# Patient Record
Sex: FEMALE | Race: WHITE | NOT HISPANIC OR LATINO | Employment: OTHER | URBAN - METROPOLITAN AREA
[De-identification: names, ages, dates, MRNs, and addresses within clinical notes are randomized per-mention and may not be internally consistent; named-entity substitution may affect disease eponyms.]

---

## 2017-03-19 ENCOUNTER — APPOINTMENT (OUTPATIENT)
Dept: LAB | Facility: CLINIC | Age: 67
End: 2017-03-19
Attending: EMERGENCY MEDICINE
Payer: MEDICARE

## 2017-03-19 ENCOUNTER — OFFICE VISIT (OUTPATIENT)
Dept: URGENT CARE | Facility: CLINIC | Age: 67
End: 2017-03-19
Payer: MEDICARE

## 2017-03-19 DIAGNOSIS — R35.0 FREQUENCY OF MICTURITION: ICD-10-CM

## 2017-03-19 PROCEDURE — 99204 OFFICE O/P NEW MOD 45 MIN: CPT

## 2017-03-19 PROCEDURE — 81002 URINALYSIS NONAUTO W/O SCOPE: CPT

## 2017-03-19 PROCEDURE — G0463 HOSPITAL OUTPT CLINIC VISIT: HCPCS

## 2017-03-19 PROCEDURE — 87186 SC STD MICRODIL/AGAR DIL: CPT

## 2017-03-19 PROCEDURE — 87086 URINE CULTURE/COLONY COUNT: CPT

## 2017-03-19 PROCEDURE — 87077 CULTURE AEROBIC IDENTIFY: CPT

## 2017-03-22 LAB — BACTERIA UR CULT: NORMAL

## 2017-03-28 ENCOUNTER — APPOINTMENT (OUTPATIENT)
Dept: LAB | Facility: CLINIC | Age: 67
End: 2017-03-28
Payer: MEDICARE

## 2017-03-28 ENCOUNTER — TRANSCRIBE ORDERS (OUTPATIENT)
Dept: URGENT CARE | Facility: CLINIC | Age: 67
End: 2017-03-28

## 2017-03-28 DIAGNOSIS — N39.0 URINARY TRACT INFECTION, SITE NOT SPECIFIED: Primary | ICD-10-CM

## 2017-03-28 DIAGNOSIS — N39.0 URINARY TRACT INFECTION, SITE NOT SPECIFIED: ICD-10-CM

## 2017-03-28 PROCEDURE — 87086 URINE CULTURE/COLONY COUNT: CPT

## 2017-03-29 LAB — BACTERIA UR CULT: NORMAL

## 2017-04-25 ENCOUNTER — APPOINTMENT (OUTPATIENT)
Dept: LAB | Facility: CLINIC | Age: 67
End: 2017-04-25
Payer: MEDICARE

## 2017-04-25 ENCOUNTER — TRANSCRIBE ORDERS (OUTPATIENT)
Dept: URGENT CARE | Facility: CLINIC | Age: 67
End: 2017-04-25

## 2017-04-25 DIAGNOSIS — N39.0 URINARY TRACT INFECTION, SITE NOT SPECIFIED: ICD-10-CM

## 2017-04-25 DIAGNOSIS — N39.0 URINARY TRACT INFECTION, SITE NOT SPECIFIED: Primary | ICD-10-CM

## 2017-04-25 LAB
CREAT UR-MCNC: 53.2 MG/DL
MICROALBUMIN UR-MCNC: 24.3 MG/L (ref 0–20)
MICROALBUMIN/CREAT 24H UR: 46 MG/G CREATININE (ref 0–30)

## 2017-04-25 PROCEDURE — 82570 ASSAY OF URINE CREATININE: CPT

## 2017-04-25 PROCEDURE — 87086 URINE CULTURE/COLONY COUNT: CPT

## 2017-04-25 PROCEDURE — 87186 SC STD MICRODIL/AGAR DIL: CPT

## 2017-04-25 PROCEDURE — 82043 UR ALBUMIN QUANTITATIVE: CPT

## 2017-04-25 PROCEDURE — 87077 CULTURE AEROBIC IDENTIFY: CPT

## 2017-04-27 LAB — BACTERIA UR CULT: NORMAL

## 2017-07-13 ENCOUNTER — TRANSCRIBE ORDERS (OUTPATIENT)
Dept: URGENT CARE | Facility: CLINIC | Age: 67
End: 2017-07-13

## 2017-07-13 ENCOUNTER — APPOINTMENT (OUTPATIENT)
Dept: LAB | Facility: CLINIC | Age: 67
End: 2017-07-13
Payer: MEDICARE

## 2017-07-13 DIAGNOSIS — E78.5 HYPERLIPIDEMIA, UNSPECIFIED HYPERLIPIDEMIA TYPE: ICD-10-CM

## 2017-07-13 DIAGNOSIS — R60.9 EDEMA, UNSPECIFIED TYPE: ICD-10-CM

## 2017-07-13 DIAGNOSIS — Z79.899 ENCOUNTER FOR LONG-TERM (CURRENT) USE OF OTHER MEDICATIONS: ICD-10-CM

## 2017-07-13 DIAGNOSIS — E55.9 VITAMIN D DEFICIENCY: ICD-10-CM

## 2017-07-13 DIAGNOSIS — E03.9 HYPOTHYROIDISM, UNSPECIFIED TYPE: ICD-10-CM

## 2017-07-13 DIAGNOSIS — R73.9 HYPERGLYCEMIA: ICD-10-CM

## 2017-07-13 DIAGNOSIS — I10 ESSENTIAL HYPERTENSION: Primary | ICD-10-CM

## 2017-07-13 LAB
25(OH)D3 SERPL-MCNC: 33.7 NG/ML (ref 30–100)
ALBUMIN SERPL BCP-MCNC: 4 G/DL (ref 3.5–5)
ALP SERPL-CCNC: 69 U/L (ref 46–116)
ALT SERPL W P-5'-P-CCNC: 28 U/L (ref 12–78)
ANION GAP SERPL CALCULATED.3IONS-SCNC: 6 MMOL/L (ref 4–13)
AST SERPL W P-5'-P-CCNC: 20 U/L (ref 5–45)
BASOPHILS # BLD AUTO: 0.02 THOUSANDS/ΜL (ref 0–0.1)
BASOPHILS NFR BLD AUTO: 1 % (ref 0–1)
BILIRUB SERPL-MCNC: 0.46 MG/DL (ref 0.2–1)
BUN SERPL-MCNC: 20 MG/DL (ref 5–25)
CALCIUM SERPL-MCNC: 9.2 MG/DL (ref 8.3–10.1)
CHLORIDE SERPL-SCNC: 107 MMOL/L (ref 100–108)
CHOLEST SERPL-MCNC: 160 MG/DL (ref 50–200)
CO2 SERPL-SCNC: 28 MMOL/L (ref 21–32)
CREAT SERPL-MCNC: 0.85 MG/DL (ref 0.6–1.3)
EOSINOPHIL # BLD AUTO: 0.19 THOUSAND/ΜL (ref 0–0.61)
EOSINOPHIL NFR BLD AUTO: 4 % (ref 0–6)
ERYTHROCYTE [DISTWIDTH] IN BLOOD BY AUTOMATED COUNT: 12.7 % (ref 11.6–15.1)
EST. AVERAGE GLUCOSE BLD GHB EST-MCNC: 123 MG/DL
GFR SERPL CREATININE-BSD FRML MDRD: >60 ML/MIN/1.73SQ M
GLUCOSE P FAST SERPL-MCNC: 106 MG/DL (ref 65–99)
HBA1C MFR BLD: 5.9 % (ref 4.2–6.3)
HCT VFR BLD AUTO: 39.4 % (ref 34.8–46.1)
HDLC SERPL-MCNC: 88 MG/DL (ref 40–60)
HGB BLD-MCNC: 13.5 G/DL (ref 11.5–15.4)
LDLC SERPL CALC-MCNC: 60 MG/DL (ref 0–100)
LYMPHOCYTES # BLD AUTO: 1.67 THOUSANDS/ΜL (ref 0.6–4.47)
LYMPHOCYTES NFR BLD AUTO: 38 % (ref 14–44)
MCH RBC QN AUTO: 30.9 PG (ref 26.8–34.3)
MCHC RBC AUTO-ENTMCNC: 34.3 G/DL (ref 31.4–37.4)
MCV RBC AUTO: 90 FL (ref 82–98)
MONOCYTES # BLD AUTO: 0.48 THOUSAND/ΜL (ref 0.17–1.22)
MONOCYTES NFR BLD AUTO: 11 % (ref 4–12)
NEUTROPHILS # BLD AUTO: 2.03 THOUSANDS/ΜL (ref 1.85–7.62)
NEUTS SEG NFR BLD AUTO: 46 % (ref 43–75)
NRBC BLD AUTO-RTO: 0 /100 WBCS
NT-PROBNP SERPL-MCNC: 58 PG/ML
PLATELET # BLD AUTO: 210 THOUSANDS/UL (ref 149–390)
PMV BLD AUTO: 11.6 FL (ref 8.9–12.7)
POTASSIUM SERPL-SCNC: 4.1 MMOL/L (ref 3.5–5.3)
PROT SERPL-MCNC: 7.4 G/DL (ref 6.4–8.2)
RBC # BLD AUTO: 4.37 MILLION/UL (ref 3.81–5.12)
SODIUM SERPL-SCNC: 141 MMOL/L (ref 136–145)
T4 FREE SERPL-MCNC: 1.26 NG/DL (ref 0.76–1.46)
TRIGL SERPL-MCNC: 61 MG/DL
TSH SERPL DL<=0.05 MIU/L-ACNC: 2.24 UIU/ML (ref 0.36–3.74)
WBC # BLD AUTO: 4.41 THOUSAND/UL (ref 4.31–10.16)

## 2017-07-13 PROCEDURE — 83880 ASSAY OF NATRIURETIC PEPTIDE: CPT

## 2017-07-13 PROCEDURE — 83036 HEMOGLOBIN GLYCOSYLATED A1C: CPT

## 2017-07-13 PROCEDURE — 82306 VITAMIN D 25 HYDROXY: CPT

## 2017-07-13 PROCEDURE — 85025 COMPLETE CBC W/AUTO DIFF WBC: CPT

## 2017-07-13 PROCEDURE — 84443 ASSAY THYROID STIM HORMONE: CPT

## 2017-07-13 PROCEDURE — 36415 COLL VENOUS BLD VENIPUNCTURE: CPT

## 2017-07-13 PROCEDURE — 80061 LIPID PANEL: CPT

## 2017-07-13 PROCEDURE — 80053 COMPREHEN METABOLIC PANEL: CPT

## 2017-07-13 PROCEDURE — 84439 ASSAY OF FREE THYROXINE: CPT

## 2017-11-03 ENCOUNTER — APPOINTMENT (OUTPATIENT)
Dept: LAB | Facility: CLINIC | Age: 67
End: 2017-11-03
Payer: MEDICARE

## 2017-11-03 ENCOUNTER — TRANSCRIBE ORDERS (OUTPATIENT)
Dept: URGENT CARE | Facility: CLINIC | Age: 67
End: 2017-11-03

## 2017-11-03 DIAGNOSIS — I10 HYPERTENSION, UNSPECIFIED TYPE: Primary | ICD-10-CM

## 2017-11-03 DIAGNOSIS — E78.5 HYPERLIPIDEMIA, UNSPECIFIED HYPERLIPIDEMIA TYPE: Primary | ICD-10-CM

## 2017-11-03 DIAGNOSIS — E78.5 HYPERLIPIDEMIA, UNSPECIFIED HYPERLIPIDEMIA TYPE: ICD-10-CM

## 2017-11-03 DIAGNOSIS — R73.03 PRE-DIABETES: ICD-10-CM

## 2017-11-03 DIAGNOSIS — E03.9 HYPOTHYROIDISM, UNSPECIFIED TYPE: ICD-10-CM

## 2017-11-03 DIAGNOSIS — I10 HYPERTENSION, UNSPECIFIED TYPE: ICD-10-CM

## 2017-11-03 LAB
ALBUMIN SERPL BCP-MCNC: 3.7 G/DL (ref 3.5–5)
ALP SERPL-CCNC: 62 U/L (ref 46–116)
ALT SERPL W P-5'-P-CCNC: 29 U/L (ref 12–78)
ANION GAP SERPL CALCULATED.3IONS-SCNC: 5 MMOL/L (ref 4–13)
AST SERPL W P-5'-P-CCNC: 20 U/L (ref 5–45)
BASOPHILS # BLD AUTO: 0.01 THOUSANDS/ΜL (ref 0–0.1)
BASOPHILS NFR BLD AUTO: 0 % (ref 0–1)
BILIRUB SERPL-MCNC: 0.51 MG/DL (ref 0.2–1)
BUN SERPL-MCNC: 22 MG/DL (ref 5–25)
CALCIUM SERPL-MCNC: 9 MG/DL (ref 8.3–10.1)
CHLORIDE SERPL-SCNC: 106 MMOL/L (ref 100–108)
CHOLEST SERPL-MCNC: 152 MG/DL (ref 50–200)
CO2 SERPL-SCNC: 27 MMOL/L (ref 21–32)
CREAT SERPL-MCNC: 0.87 MG/DL (ref 0.6–1.3)
CREAT UR-MCNC: 117 MG/DL
EOSINOPHIL # BLD AUTO: 0.1 THOUSAND/ΜL (ref 0–0.61)
EOSINOPHIL NFR BLD AUTO: 2 % (ref 0–6)
ERYTHROCYTE [DISTWIDTH] IN BLOOD BY AUTOMATED COUNT: 12.2 % (ref 11.6–15.1)
EST. AVERAGE GLUCOSE BLD GHB EST-MCNC: 123 MG/DL
GFR SERPL CREATININE-BSD FRML MDRD: 69 ML/MIN/1.73SQ M
GLUCOSE P FAST SERPL-MCNC: 113 MG/DL (ref 65–99)
HBA1C MFR BLD: 5.9 % (ref 4.2–6.3)
HCT VFR BLD AUTO: 41 % (ref 34.8–46.1)
HDLC SERPL-MCNC: 98 MG/DL (ref 40–60)
HGB BLD-MCNC: 14 G/DL (ref 11.5–15.4)
LDLC SERPL CALC-MCNC: 41 MG/DL (ref 0–100)
LYMPHOCYTES # BLD AUTO: 1.54 THOUSANDS/ΜL (ref 0.6–4.47)
LYMPHOCYTES NFR BLD AUTO: 35 % (ref 14–44)
MAGNESIUM SERPL-MCNC: 2.3 MG/DL (ref 1.6–2.6)
MCH RBC QN AUTO: 31.4 PG (ref 26.8–34.3)
MCHC RBC AUTO-ENTMCNC: 34.1 G/DL (ref 31.4–37.4)
MCV RBC AUTO: 92 FL (ref 82–98)
MICROALBUMIN UR-MCNC: 6.1 MG/L (ref 0–20)
MICROALBUMIN/CREAT 24H UR: 5 MG/G CREATININE (ref 0–30)
MONOCYTES # BLD AUTO: 0.55 THOUSAND/ΜL (ref 0.17–1.22)
MONOCYTES NFR BLD AUTO: 13 % (ref 4–12)
NEUTROPHILS # BLD AUTO: 2.17 THOUSANDS/ΜL (ref 1.85–7.62)
NEUTS SEG NFR BLD AUTO: 50 % (ref 43–75)
NRBC BLD AUTO-RTO: 0 /100 WBCS
PHOSPHATE SERPL-MCNC: 3.4 MG/DL (ref 2.3–4.1)
PLATELET # BLD AUTO: 216 THOUSANDS/UL (ref 149–390)
PMV BLD AUTO: 11.5 FL (ref 8.9–12.7)
POTASSIUM SERPL-SCNC: 4.2 MMOL/L (ref 3.5–5.3)
PROT SERPL-MCNC: 7.3 G/DL (ref 6.4–8.2)
RBC # BLD AUTO: 4.46 MILLION/UL (ref 3.81–5.12)
SODIUM SERPL-SCNC: 138 MMOL/L (ref 136–145)
T4 FREE SERPL-MCNC: 1.25 NG/DL (ref 0.76–1.46)
TRIGL SERPL-MCNC: 67 MG/DL
TSH SERPL DL<=0.05 MIU/L-ACNC: 1.12 UIU/ML (ref 0.36–3.74)
WBC # BLD AUTO: 4.39 THOUSAND/UL (ref 4.31–10.16)

## 2017-11-03 PROCEDURE — 85025 COMPLETE CBC W/AUTO DIFF WBC: CPT

## 2017-11-03 PROCEDURE — 82570 ASSAY OF URINE CREATININE: CPT

## 2017-11-03 PROCEDURE — 83036 HEMOGLOBIN GLYCOSYLATED A1C: CPT

## 2017-11-03 PROCEDURE — 83735 ASSAY OF MAGNESIUM: CPT

## 2017-11-03 PROCEDURE — 82043 UR ALBUMIN QUANTITATIVE: CPT

## 2017-11-03 PROCEDURE — 80061 LIPID PANEL: CPT

## 2017-11-03 PROCEDURE — 36415 COLL VENOUS BLD VENIPUNCTURE: CPT

## 2017-11-03 PROCEDURE — 84439 ASSAY OF FREE THYROXINE: CPT

## 2017-11-03 PROCEDURE — 84443 ASSAY THYROID STIM HORMONE: CPT

## 2017-11-03 PROCEDURE — 80053 COMPREHEN METABOLIC PANEL: CPT

## 2017-11-03 PROCEDURE — 84100 ASSAY OF PHOSPHORUS: CPT

## 2018-01-25 ENCOUNTER — OFFICE VISIT (OUTPATIENT)
Dept: OBGYN CLINIC | Facility: CLINIC | Age: 68
End: 2018-01-25
Payer: MEDICARE

## 2018-01-25 VITALS
BODY MASS INDEX: 37.85 KG/M2 | DIASTOLIC BLOOD PRESSURE: 78 MMHG | WEIGHT: 227.2 LBS | SYSTOLIC BLOOD PRESSURE: 130 MMHG | HEIGHT: 65 IN

## 2018-01-25 DIAGNOSIS — Z01.419 ENCOUNTER FOR ANNUAL ROUTINE GYNECOLOGICAL EXAMINATION: Primary | ICD-10-CM

## 2018-01-25 DIAGNOSIS — Z12.31 ENCOUNTER FOR SCREENING MAMMOGRAM FOR BREAST CANCER: ICD-10-CM

## 2018-01-25 PROBLEM — Z87.448 HISTORY OF CYSTOCELE: Status: ACTIVE | Noted: 2017-12-28

## 2018-01-25 PROBLEM — M15.9 OSTEOARTHRITIS, MULTIPLE SITES: Status: ACTIVE | Noted: 2017-03-19

## 2018-01-25 PROBLEM — K21.9 GASTROESOPHAGEAL REFLUX DISEASE: Status: ACTIVE | Noted: 2017-03-19

## 2018-01-25 PROBLEM — E78.5 HYPERLIPIDEMIA: Status: ACTIVE | Noted: 2017-03-19

## 2018-01-25 PROBLEM — M19.90 ARTHRITIS: Status: ACTIVE | Noted: 2017-12-28

## 2018-01-25 PROBLEM — R73.03 PREDIABETES: Status: ACTIVE | Noted: 2017-03-19

## 2018-01-25 PROBLEM — E06.3 HASHIMOTO'S THYROIDITIS: Status: ACTIVE | Noted: 2017-03-19

## 2018-01-25 PROBLEM — J45.909 ASTHMA: Status: ACTIVE | Noted: 2017-03-19

## 2018-01-25 PROBLEM — H35.30 MACULAR DEGENERATION: Status: ACTIVE | Noted: 2017-03-19

## 2018-01-25 PROBLEM — I25.10 CORONARY DISEASE: Status: ACTIVE | Noted: 2017-03-19

## 2018-01-25 PROCEDURE — G0101 CA SCREEN;PELVIC/BREAST EXAM: HCPCS | Performed by: NURSE PRACTITIONER

## 2018-01-25 RX ORDER — ALBUTEROL SULFATE 90 UG/1
AEROSOL, METERED RESPIRATORY (INHALATION)
COMMUNITY
End: 2019-01-28

## 2018-01-25 RX ORDER — SULFAMETHOXAZOLE AND TRIMETHOPRIM 800; 160 MG/1; MG/1
1 TABLET ORAL 2 TIMES DAILY
COMMUNITY
Start: 2017-03-19 | End: 2018-01-25 | Stop reason: SDUPTHER

## 2018-01-25 RX ORDER — LEVOTHYROXINE SODIUM 175 UG/1
175 TABLET ORAL
COMMUNITY
Start: 2014-11-13 | End: 2022-03-04

## 2018-01-25 RX ORDER — LORATADINE 10 MG/1
TABLET ORAL
COMMUNITY
End: 2018-01-25 | Stop reason: SDUPTHER

## 2018-01-25 RX ORDER — MONTELUKAST SODIUM 10 MG/1
TABLET ORAL
COMMUNITY

## 2018-01-25 RX ORDER — CLOPIDOGREL BISULFATE 75 MG/1
TABLET ORAL
COMMUNITY
End: 2018-01-25 | Stop reason: SDUPTHER

## 2018-01-25 RX ORDER — ASPIRIN 81 MG/1
81 TABLET, CHEWABLE ORAL DAILY
COMMUNITY

## 2018-01-25 RX ORDER — SPIRONOLACTONE 25 MG/1
25 TABLET ORAL DAILY
COMMUNITY

## 2018-01-25 RX ORDER — CEPHALEXIN 500 MG/1
CAPSULE ORAL
COMMUNITY
Start: 2017-10-25 | End: 2020-07-04 | Stop reason: ALTCHOICE

## 2018-01-25 RX ORDER — SENNOSIDES 8.6 MG
650 CAPSULE ORAL
COMMUNITY
Start: 2011-01-25

## 2018-01-25 RX ORDER — BIOTIN 2500 MCG
2500 CAPSULE ORAL DAILY
COMMUNITY

## 2018-01-25 RX ORDER — GABAPENTIN 100 MG/1
100 CAPSULE ORAL 3 TIMES DAILY
COMMUNITY
Start: 2015-11-02

## 2018-01-25 RX ORDER — METOPROLOL SUCCINATE 25 MG/1
25 TABLET, EXTENDED RELEASE ORAL DAILY
COMMUNITY
Start: 2017-03-03

## 2018-01-25 RX ORDER — OMEPRAZOLE 20 MG/1
20 CAPSULE, DELAYED RELEASE ORAL 2 TIMES DAILY
COMMUNITY

## 2018-01-25 RX ORDER — ATORVASTATIN CALCIUM 80 MG/1
80 TABLET, FILM COATED ORAL DAILY
COMMUNITY
Start: 2017-03-03

## 2018-01-25 RX ORDER — BETAMETHASONE DIPROPIONATE 0.5 MG/ML
LOTION, AUGMENTED TOPICAL AS NEEDED
COMMUNITY
Start: 2015-11-04

## 2018-01-25 RX ORDER — NITROGLYCERIN 0.4 MG/1
TABLET SUBLINGUAL
COMMUNITY

## 2018-01-25 RX ORDER — PHENAZOPYRIDINE HYDROCHLORIDE 200 MG/1
1 TABLET, FILM COATED ORAL 3 TIMES DAILY PRN
COMMUNITY
Start: 2017-03-19 | End: 2018-01-25 | Stop reason: SDUPTHER

## 2018-01-25 RX ORDER — LEVOTHYROXINE SODIUM 0.15 MG/1
150 TABLET ORAL
COMMUNITY
Start: 2008-06-09 | End: 2020-07-04 | Stop reason: SDUPTHER

## 2018-01-25 NOTE — PATIENT INSTRUCTIONS
Continue with healthy diet and regular exercise (3-5 times per week)  Adequate calcium 1200 mg daily with 800 units of vitamin D (both dietary and supplement combined)  Split into a m  and p m  dosing (600 mg a m, 600 mg p m )  Weight bearing exercise      Return visit one year for annual exam

## 2018-01-25 NOTE — PROGRESS NOTES
Assessment/Plan:  Normal well-woman exam   Pap not indicated due to benign hysterectomy  Mammogram order provided  Up to date on colonoscopy and DEXA  RV one year  No problem-specific Assessment & Plan notes found for this encounter  Diagnoses and all orders for this visit:    Encounter for annual routine gynecological examination    Encounter for screening mammogram for breast cancer  -     Mammo screening bilateral w cad; Future    Other orders  -     acetaminophen (TYLENOL) 650 mg CR tablet; Take 1,300 mg by mouth  -     Discontinue: Acetaminophen 500 MG; Take by mouth  -     fluticasone-salmeterol (ADVAIR DISKUS) 100-50 mcg/dose; Inhale 2 puffs  -     Discontinue: fluticasone-salmeterol (ADVAIR DISKUS) 100-50 mcg/dose; Inhale  -     spironolactone (ALDACTONE) 25 mg tablet; Take by mouth  -     aspirin 81 mg chewable tablet; Chew 81 mg  -     atorvastatin (LIPITOR) 80 mg tablet; Take 80 mg by mouth  -     Biotin (BIOTIN 5000) 5 MG CAPS; Take 5 mg by mouth  -     cephalexin (KEFLEX) 500 mg capsule;   -     levothyroxine 175 mcg tablet; Take 175 mcg by mouth  -     Discontinue: loratadine (CLARITIN) 10 mg tablet; Take by mouth  -     montelukast (SINGULAIR) 10 mg tablet; Take by mouth  -     Multiple Vitamin (MULTI-VITAMIN DAILY PO); Take by mouth  -     nitroglycerin (NITROSTAT) 0 4 mg SL tablet; Place under the tongue  -     omeprazole (PriLOSEC) 20 mg delayed release capsule; Take by mouth  -     albuterol (PROVENTIL HFA) 90 mcg/act inhaler; Inhale  -     betamethasone, augmented, (DIPROLENE) 0 05 % lotion; Apply topically  -     gabapentin (NEURONTIN) 100 mg capsule; Take 100 mg by mouth  -     metoprolol succinate (TOPROL-XL) 25 mg 24 hr tablet; Take 25 mg by mouth  -     Discontinue: metoprolol tartrate (LOPRESSOR) 25 mg tablet; Take by mouth  -     Discontinue: clopidogrel (PLAVIX) 75 mg tablet;  Take by mouth  -     Discontinue: sulfamethoxazole-trimethoprim (BACTRIM DS) 800-160 mg per tablet; Take 1 tablet by mouth 2 (two) times a day  -     Discontinue: phenazopyridine (PYRIDIUM) 200 mg tablet; Take 1 tablet by mouth 3 (three) times a day as needed  -     levothyroxine 150 mcg tablet; Take 150 mcg by mouth  -     loratadine (CLARITIN REDITABS) 10 MG dissolvable tablet; Take 10 mg by mouth          Subjective:      Patient ID: Jordi Huggins is a 75 y o  female  HPI  Kera Tate is a 74 yo female who presents for her yearly gyn exam   She is known to me from [facility]  [facility] records reviewed from [facility]  History ENRIQUE/BSO  11/2017 mammogram negative, 2/4  Colonoscopy 2010  DEXA 2015 normal    She has no problems to report  The following portions of the patient's history were reviewed and updated as appropriate: allergies, current medications, past family history, past medical history, past social history, past surgical history and problem list     Review of Systems   Constitutional: Negative for chills and fever  Gastrointestinal: Negative for abdominal distention, abdominal pain, blood in stool, constipation, diarrhea, nausea and vomiting  Genitourinary: Positive for urgency  Negative for difficulty urinating, dysuria, frequency, genital sores, hematuria, menstrual problem, pelvic pain, vaginal bleeding and vaginal discharge  Objective:     Physical Exam   Constitutional: She appears well-developed and well-nourished  No distress  Neck: Neck supple  No thyromegaly present  Pulmonary/Chest: Right breast exhibits no inverted nipple, no mass, no nipple discharge, no skin change and no tenderness  Left breast exhibits no inverted nipple, no mass, no nipple discharge, no skin change and no tenderness  Breasts are symmetrical    Abdominal: Soft  Normal appearance  She exhibits no mass  There is no tenderness  There is no CVA tenderness  Genitourinary: Rectum normal  Rectal exam shows guaiac negative stool  No labial fusion  There is no rash, tenderness, lesion or injury on the right labia  There is no rash, tenderness, lesion or injury on the left labia  No erythema, tenderness or bleeding in the vagina  No foreign body in the vagina  No signs of injury around the vagina  No vaginal discharge found  Genitourinary Comments: Cervix, uterus and ovaries are surgically absent  Lymphadenopathy:     She has no cervical adenopathy  She has no axillary adenopathy  Right: No inguinal and no supraclavicular adenopathy present  Left: No inguinal and no supraclavicular adenopathy present  Neurological: She is alert  She is not disoriented  Skin: Skin is warm, dry and intact  Psychiatric: She has a normal mood and affect   Her behavior is normal

## 2018-04-10 ENCOUNTER — TRANSCRIBE ORDERS (OUTPATIENT)
Dept: LAB | Facility: CLINIC | Age: 68
End: 2018-04-10

## 2018-04-10 ENCOUNTER — APPOINTMENT (OUTPATIENT)
Dept: LAB | Facility: CLINIC | Age: 68
End: 2018-04-10
Payer: MEDICARE

## 2018-04-10 DIAGNOSIS — E78.5 HYPERLIPIDEMIA, UNSPECIFIED HYPERLIPIDEMIA TYPE: ICD-10-CM

## 2018-04-10 DIAGNOSIS — R60.9 EDEMA, UNSPECIFIED TYPE: ICD-10-CM

## 2018-04-10 DIAGNOSIS — R73.9 HYPERGLYCEMIA: ICD-10-CM

## 2018-04-10 DIAGNOSIS — Z11.59 NEED FOR HEPATITIS C SCREENING TEST: ICD-10-CM

## 2018-04-10 DIAGNOSIS — E55.9 VITAMIN D DEFICIENCY: ICD-10-CM

## 2018-04-10 DIAGNOSIS — I10 HYPERTENSION, UNSPECIFIED TYPE: Primary | ICD-10-CM

## 2018-04-10 DIAGNOSIS — E03.9 HYPOTHYROIDISM, UNSPECIFIED TYPE: ICD-10-CM

## 2018-04-10 LAB
25(OH)D3 SERPL-MCNC: 39.5 NG/ML (ref 30–100)
ALBUMIN SERPL BCP-MCNC: 4.1 G/DL (ref 3.5–5)
ALP SERPL-CCNC: 67 U/L (ref 46–116)
ALT SERPL W P-5'-P-CCNC: 30 U/L (ref 12–78)
ANION GAP SERPL CALCULATED.3IONS-SCNC: 3 MMOL/L (ref 4–13)
AST SERPL W P-5'-P-CCNC: 22 U/L (ref 5–45)
BILIRUB SERPL-MCNC: 0.52 MG/DL (ref 0.2–1)
BUN SERPL-MCNC: 15 MG/DL (ref 5–25)
CALCIUM SERPL-MCNC: 9.4 MG/DL
CHLORIDE SERPL-SCNC: 105 MMOL/L (ref 100–108)
CHOLEST SERPL-MCNC: 128 MG/DL (ref 50–200)
CO2 SERPL-SCNC: 29 MMOL/L (ref 21–32)
CREAT SERPL-MCNC: 0.82 MG/DL (ref 0.6–1.3)
EST. AVERAGE GLUCOSE BLD GHB EST-MCNC: 117 MG/DL
GFR SERPL CREATININE-BSD FRML MDRD: 74 ML/MIN/1.73SQ M
GLUCOSE P FAST SERPL-MCNC: 108 MG/DL (ref 65–99)
HBA1C MFR BLD: 5.7 % (ref 4.2–6.3)
HDLC SERPL-MCNC: 74 MG/DL (ref 40–60)
LDLC SERPL CALC-MCNC: 40 MG/DL (ref 0–100)
NONHDLC SERPL-MCNC: 54 MG/DL
NT-PROBNP SERPL-MCNC: 55 PG/ML
POTASSIUM SERPL-SCNC: 4.3 MMOL/L (ref 3.5–5.3)
PROT SERPL-MCNC: 7.5 G/DL (ref 6.4–8.2)
SODIUM SERPL-SCNC: 137 MMOL/L (ref 136–145)
T4 FREE SERPL-MCNC: 1.59 NG/DL (ref 0.76–1.46)
TRIGL SERPL-MCNC: 72 MG/DL
TSH SERPL DL<=0.05 MIU/L-ACNC: 0.28 UIU/ML (ref 0.36–3.74)

## 2018-04-10 PROCEDURE — 80061 LIPID PANEL: CPT

## 2018-04-10 PROCEDURE — 36415 COLL VENOUS BLD VENIPUNCTURE: CPT

## 2018-04-10 PROCEDURE — 83880 ASSAY OF NATRIURETIC PEPTIDE: CPT

## 2018-04-10 PROCEDURE — 82306 VITAMIN D 25 HYDROXY: CPT

## 2018-04-10 PROCEDURE — 80053 COMPREHEN METABOLIC PANEL: CPT

## 2018-04-10 PROCEDURE — 84443 ASSAY THYROID STIM HORMONE: CPT

## 2018-04-10 PROCEDURE — 83036 HEMOGLOBIN GLYCOSYLATED A1C: CPT

## 2018-04-10 PROCEDURE — 86803 HEPATITIS C AB TEST: CPT

## 2018-04-10 PROCEDURE — 84439 ASSAY OF FREE THYROXINE: CPT

## 2018-04-11 LAB — HCV AB SER QL: NORMAL

## 2018-08-27 ENCOUNTER — TRANSCRIBE ORDERS (OUTPATIENT)
Dept: ADMINISTRATIVE | Facility: HOSPITAL | Age: 68
End: 2018-08-27

## 2018-08-27 DIAGNOSIS — M81.0 OSTEOPOROSIS, UNSPECIFIED OSTEOPOROSIS TYPE, UNSPECIFIED PATHOLOGICAL FRACTURE PRESENCE: Primary | ICD-10-CM

## 2018-08-28 ENCOUNTER — APPOINTMENT (OUTPATIENT)
Dept: LAB | Facility: CLINIC | Age: 68
End: 2018-08-28
Payer: MEDICARE

## 2018-08-28 ENCOUNTER — TRANSCRIBE ORDERS (OUTPATIENT)
Dept: URGENT CARE | Facility: CLINIC | Age: 68
End: 2018-08-28

## 2018-08-28 DIAGNOSIS — E03.9 HYPOTHYROIDISM, ADULT: Primary | ICD-10-CM

## 2018-08-28 LAB
T4 FREE SERPL-MCNC: 1.32 NG/DL (ref 0.76–1.46)
TSH SERPL DL<=0.05 MIU/L-ACNC: 0.89 UIU/ML (ref 0.36–3.74)

## 2018-08-28 PROCEDURE — 84439 ASSAY OF FREE THYROXINE: CPT

## 2018-08-28 PROCEDURE — 84443 ASSAY THYROID STIM HORMONE: CPT

## 2018-08-28 PROCEDURE — 36415 COLL VENOUS BLD VENIPUNCTURE: CPT

## 2018-09-26 ENCOUNTER — HOSPITAL ENCOUNTER (OUTPATIENT)
Dept: BONE DENSITY | Facility: HOSPITAL | Age: 68
Discharge: HOME | End: 2018-09-26
Attending: INTERNAL MEDICINE
Payer: MEDICARE

## 2018-09-26 DIAGNOSIS — M81.0 OSTEOPOROSIS, UNSPECIFIED OSTEOPOROSIS TYPE, UNSPECIFIED PATHOLOGICAL FRACTURE PRESENCE: ICD-10-CM

## 2018-09-26 PROCEDURE — 77080 DXA BONE DENSITY AXIAL: CPT

## 2019-01-28 ENCOUNTER — ANNUAL EXAM (OUTPATIENT)
Dept: OBGYN CLINIC | Facility: CLINIC | Age: 69
End: 2019-01-28
Payer: MEDICARE

## 2019-01-28 VITALS
BODY MASS INDEX: 36.35 KG/M2 | SYSTOLIC BLOOD PRESSURE: 140 MMHG | HEIGHT: 65 IN | WEIGHT: 218.2 LBS | DIASTOLIC BLOOD PRESSURE: 90 MMHG | OXYGEN SATURATION: 97 % | HEART RATE: 70 BPM

## 2019-01-28 DIAGNOSIS — Z12.31 BREAST CANCER SCREENING BY MAMMOGRAM: ICD-10-CM

## 2019-01-28 DIAGNOSIS — Z01.419 ENCOUNTER FOR GYNECOLOGICAL EXAMINATION WITHOUT ABNORMAL FINDING: Primary | ICD-10-CM

## 2019-01-28 PROCEDURE — G0101 CA SCREEN;PELVIC/BREAST EXAM: HCPCS | Performed by: NURSE PRACTITIONER

## 2019-01-28 NOTE — PROGRESS NOTES
Assessment / Plan    1  Encounter for gynecological examination without abnormal finding  Normal well woman exam  Cervical cancer screening not indicated-- hx of benign hysterectomy  Up to date on colonoscopy    2  Breast cancer screening by mammogram  Order provided  - Mammo screening bilateral w cad; Future          Subjective      Shannan Vivar is a 70 y o  female who presents for her annual gynecologic exam     Hx of ENRIQUE w/ BSO, benign    /90  BMI 36 8  Asthma, hyperlipidemia, hypothyroidism, seasonal allergies, GERD  H/o DVT    Last pap- n/a  Last mammogram 2017 negative  Colonoscopy:   DEXA scan: 2018 normal    History of abnormal Pap smear: no  Family history of breast,uterine, ovarian or colon cancer: no      Menstrual History:  OB History      Para Term  AB Living    2 2            SAB TAB Ectopic Multiple Live Births                        No LMP recorded  Patient has had a hysterectomy  The following portions of the patient's history were reviewed and updated as appropriate: allergies, current medications, past family history, past medical history, past social history, past surgical history and problem list     Review of Systems      Review of Systems   Constitutional: Negative for chills and fever  Gastrointestinal: Negative for abdominal distention, abdominal pain, blood in stool, constipation, diarrhea, nausea and vomiting  Genitourinary: Negative for difficulty urinating, dysuria, frequency, genital sores, hematuria, menstrual problem, pelvic pain, urgency, vaginal bleeding and vaginal discharge       Breasts:  Negative for skin changes, dimpling, asymmetry, nipple discharge, redness, tenderness or palpable masses    Objective      /90 (BP Location: Left arm, Patient Position: Sitting, Cuff Size: Adult)   Pulse 70   Ht 5' 4 57" (1 64 m)   Wt 99 kg (218 lb 3 2 oz)   SpO2 97%   BMI 36 80 kg/m²      Physical Exam   Constitutional: She is oriented to person, place, and time  She appears well-developed and well-nourished  No distress  HENT:   Head: Normocephalic and atraumatic  Eyes: Pupils are equal, round, and reactive to light  Neck: Neck supple  No thyromegaly present  Pulmonary/Chest: Effort normal  Right breast exhibits no inverted nipple, no mass, no nipple discharge, no skin change and no tenderness  Left breast exhibits no inverted nipple, no mass, no nipple discharge, no skin change and no tenderness  Breasts are symmetrical    Abdominal: Soft  Normal appearance  She exhibits no mass  There is no tenderness  There is no CVA tenderness  Genitourinary: Rectum normal and uterus normal  Rectal exam shows guaiac negative stool  No labial fusion  There is no rash, tenderness, lesion or injury on the right labia  There is no rash, tenderness, lesion or injury on the left labia  Right adnexum displays no mass, no tenderness and no fullness  Left adnexum displays no mass, no tenderness and no fullness  No erythema, tenderness or bleeding in the vagina  No foreign body in the vagina  No signs of injury around the vagina  No vaginal discharge found  Genitourinary Comments: Cervix, uterus and ovaries are surgically absent  Lymphadenopathy:     She has no cervical adenopathy  She has no axillary adenopathy  Right: No inguinal and no supraclavicular adenopathy present  Left: No inguinal and no supraclavicular adenopathy present  Neurological: She is alert and oriented to person, place, and time  She is not disoriented  Skin: Skin is warm, dry and intact  Psychiatric: She has a normal mood and affect   Her behavior is normal  Thought content normal

## 2019-02-06 DIAGNOSIS — Z12.31 BREAST CANCER SCREENING BY MAMMOGRAM: ICD-10-CM

## 2019-03-26 ENCOUNTER — APPOINTMENT (OUTPATIENT)
Dept: RADIOLOGY | Facility: CLINIC | Age: 69
End: 2019-03-26
Payer: MEDICARE

## 2019-03-26 ENCOUNTER — TRANSCRIBE ORDERS (OUTPATIENT)
Dept: URGENT CARE | Facility: CLINIC | Age: 69
End: 2019-03-26

## 2019-03-26 DIAGNOSIS — M54.9 BACK PAIN, UNSPECIFIED BACK LOCATION, UNSPECIFIED BACK PAIN LATERALITY, UNSPECIFIED CHRONICITY: ICD-10-CM

## 2019-03-26 DIAGNOSIS — M25.551 PAIN OF BOTH HIP JOINTS: ICD-10-CM

## 2019-03-26 DIAGNOSIS — M54.9 BACK PAIN, UNSPECIFIED BACK LOCATION, UNSPECIFIED BACK PAIN LATERALITY, UNSPECIFIED CHRONICITY: Primary | ICD-10-CM

## 2019-03-26 DIAGNOSIS — M25.552 PAIN OF BOTH HIP JOINTS: ICD-10-CM

## 2019-03-26 PROCEDURE — 72110 X-RAY EXAM L-2 SPINE 4/>VWS: CPT

## 2019-03-26 PROCEDURE — 73522 X-RAY EXAM HIPS BI 3-4 VIEWS: CPT

## 2019-04-24 ENCOUNTER — TRANSCRIBE ORDERS (OUTPATIENT)
Dept: LAB | Facility: CLINIC | Age: 69
End: 2019-04-24

## 2019-04-24 ENCOUNTER — APPOINTMENT (OUTPATIENT)
Dept: LAB | Facility: CLINIC | Age: 69
End: 2019-04-24
Payer: MEDICARE

## 2019-04-24 DIAGNOSIS — Z79.899 LONG TERM USE OF DRUG: ICD-10-CM

## 2019-04-24 DIAGNOSIS — E78.5 HYPERLIPIDEMIA, UNSPECIFIED HYPERLIPIDEMIA TYPE: ICD-10-CM

## 2019-04-24 DIAGNOSIS — E55.9 VITAMIN D DEFICIENCY: ICD-10-CM

## 2019-04-24 DIAGNOSIS — I10 HYPERTENSION, UNSPECIFIED TYPE: Primary | ICD-10-CM

## 2019-04-24 DIAGNOSIS — R73.9 HYPERGLYCEMIA: ICD-10-CM

## 2019-04-24 DIAGNOSIS — R63.5 WEIGHT GAIN: ICD-10-CM

## 2019-04-24 LAB
25(OH)D3 SERPL-MCNC: 33.8 NG/ML (ref 30–100)
ALBUMIN SERPL BCP-MCNC: 3.7 G/DL (ref 3.5–5)
ALP SERPL-CCNC: 75 U/L (ref 46–116)
ALT SERPL W P-5'-P-CCNC: 33 U/L (ref 12–78)
ANION GAP SERPL CALCULATED.3IONS-SCNC: 6 MMOL/L (ref 4–13)
AST SERPL W P-5'-P-CCNC: 23 U/L (ref 5–45)
BILIRUB SERPL-MCNC: 0.47 MG/DL (ref 0.2–1)
BUN SERPL-MCNC: 20 MG/DL (ref 5–25)
CALCIUM SERPL-MCNC: 9.1 MG/DL (ref 8.3–10.1)
CHLORIDE SERPL-SCNC: 108 MMOL/L (ref 100–108)
CHOLEST SERPL-MCNC: 154 MG/DL (ref 50–200)
CO2 SERPL-SCNC: 28 MMOL/L (ref 21–32)
CREAT SERPL-MCNC: 0.81 MG/DL (ref 0.6–1.3)
ERYTHROCYTE [DISTWIDTH] IN BLOOD BY AUTOMATED COUNT: 12.2 % (ref 11.6–15.1)
EST. AVERAGE GLUCOSE BLD GHB EST-MCNC: 117 MG/DL
GFR SERPL CREATININE-BSD FRML MDRD: 74 ML/MIN/1.73SQ M
GLUCOSE P FAST SERPL-MCNC: 106 MG/DL (ref 65–99)
HBA1C MFR BLD: 5.7 % (ref 4.2–6.3)
HCT VFR BLD AUTO: 42 % (ref 34.8–46.1)
HDLC SERPL-MCNC: 83 MG/DL (ref 40–60)
HGB BLD-MCNC: 13.7 G/DL (ref 11.5–15.4)
LDLC SERPL CALC-MCNC: 59 MG/DL (ref 0–100)
MCH RBC QN AUTO: 30.7 PG (ref 26.8–34.3)
MCHC RBC AUTO-ENTMCNC: 32.6 G/DL (ref 31.4–37.4)
MCV RBC AUTO: 94 FL (ref 82–98)
NONHDLC SERPL-MCNC: 71 MG/DL
PLATELET # BLD AUTO: 178 THOUSANDS/UL (ref 149–390)
PMV BLD AUTO: 12.5 FL (ref 8.9–12.7)
POTASSIUM SERPL-SCNC: 4.6 MMOL/L (ref 3.5–5.3)
PROT SERPL-MCNC: 7.6 G/DL (ref 6.4–8.2)
RBC # BLD AUTO: 4.46 MILLION/UL (ref 3.81–5.12)
SODIUM SERPL-SCNC: 142 MMOL/L (ref 136–145)
T4 FREE SERPL-MCNC: 1.26 NG/DL (ref 0.76–1.46)
TRIGL SERPL-MCNC: 61 MG/DL
TSH SERPL DL<=0.05 MIU/L-ACNC: 1.4 UIU/ML (ref 0.36–3.74)
WBC # BLD AUTO: 3.89 THOUSAND/UL (ref 4.31–10.16)

## 2019-04-24 PROCEDURE — 80053 COMPREHEN METABOLIC PANEL: CPT

## 2019-04-24 PROCEDURE — 85027 COMPLETE CBC AUTOMATED: CPT

## 2019-04-24 PROCEDURE — 80061 LIPID PANEL: CPT

## 2019-04-24 PROCEDURE — 84443 ASSAY THYROID STIM HORMONE: CPT

## 2019-04-24 PROCEDURE — 82306 VITAMIN D 25 HYDROXY: CPT

## 2019-04-24 PROCEDURE — 83036 HEMOGLOBIN GLYCOSYLATED A1C: CPT

## 2019-04-24 PROCEDURE — 36415 COLL VENOUS BLD VENIPUNCTURE: CPT

## 2019-04-24 PROCEDURE — 84439 ASSAY OF FREE THYROXINE: CPT

## 2019-04-26 ENCOUNTER — EVALUATION (OUTPATIENT)
Dept: PHYSICAL THERAPY | Facility: CLINIC | Age: 69
End: 2019-04-26
Payer: MEDICARE

## 2019-04-26 ENCOUNTER — TRANSCRIBE ORDERS (OUTPATIENT)
Dept: PHYSICAL THERAPY | Facility: CLINIC | Age: 69
End: 2019-04-26

## 2019-04-26 DIAGNOSIS — M51.36 DEGENERATION OF LUMBAR INTERVERTEBRAL DISC: Primary | ICD-10-CM

## 2019-04-26 DIAGNOSIS — M48.061 SPINAL STENOSIS, LUMBAR REGION, WITHOUT NEUROGENIC CLAUDICATION: ICD-10-CM

## 2019-04-26 PROCEDURE — 97162 PT EVAL MOD COMPLEX 30 MIN: CPT | Performed by: PHYSICAL THERAPIST

## 2019-04-29 ENCOUNTER — OFFICE VISIT (OUTPATIENT)
Dept: PHYSICAL THERAPY | Facility: CLINIC | Age: 69
End: 2019-04-29
Payer: MEDICARE

## 2019-04-29 DIAGNOSIS — M51.36 DEGENERATION OF LUMBAR INTERVERTEBRAL DISC: Primary | ICD-10-CM

## 2019-04-29 DIAGNOSIS — M48.061 SPINAL STENOSIS, LUMBAR REGION, WITHOUT NEUROGENIC CLAUDICATION: ICD-10-CM

## 2019-04-29 PROCEDURE — 97110 THERAPEUTIC EXERCISES: CPT

## 2019-04-29 PROCEDURE — 97140 MANUAL THERAPY 1/> REGIONS: CPT

## 2019-05-01 ENCOUNTER — OFFICE VISIT (OUTPATIENT)
Dept: PHYSICAL THERAPY | Facility: CLINIC | Age: 69
End: 2019-05-01
Payer: MEDICARE

## 2019-05-01 DIAGNOSIS — M51.36 DEGENERATION OF LUMBAR INTERVERTEBRAL DISC: Primary | ICD-10-CM

## 2019-05-01 DIAGNOSIS — M48.061 SPINAL STENOSIS, LUMBAR REGION, WITHOUT NEUROGENIC CLAUDICATION: ICD-10-CM

## 2019-05-01 PROCEDURE — 97150 GROUP THERAPEUTIC PROCEDURES: CPT

## 2019-05-01 PROCEDURE — 97140 MANUAL THERAPY 1/> REGIONS: CPT

## 2019-05-03 ENCOUNTER — OFFICE VISIT (OUTPATIENT)
Dept: PHYSICAL THERAPY | Facility: CLINIC | Age: 69
End: 2019-05-03
Payer: MEDICARE

## 2019-05-03 DIAGNOSIS — M48.061 SPINAL STENOSIS, LUMBAR REGION, WITHOUT NEUROGENIC CLAUDICATION: ICD-10-CM

## 2019-05-03 DIAGNOSIS — M51.36 DEGENERATION OF LUMBAR INTERVERTEBRAL DISC: Primary | ICD-10-CM

## 2019-05-03 PROCEDURE — 97150 GROUP THERAPEUTIC PROCEDURES: CPT | Performed by: PHYSICAL THERAPIST

## 2019-05-03 PROCEDURE — 97140 MANUAL THERAPY 1/> REGIONS: CPT | Performed by: PHYSICAL THERAPIST

## 2019-05-06 ENCOUNTER — TRANSCRIBE ORDERS (OUTPATIENT)
Dept: URGENT CARE | Facility: CLINIC | Age: 69
End: 2019-05-06

## 2019-05-06 ENCOUNTER — APPOINTMENT (OUTPATIENT)
Dept: RADIOLOGY | Facility: CLINIC | Age: 69
End: 2019-05-06
Payer: MEDICARE

## 2019-05-06 ENCOUNTER — OFFICE VISIT (OUTPATIENT)
Dept: PHYSICAL THERAPY | Facility: CLINIC | Age: 69
End: 2019-05-06
Payer: MEDICARE

## 2019-05-06 DIAGNOSIS — M79.672 LEFT FOOT PAIN: ICD-10-CM

## 2019-05-06 DIAGNOSIS — M51.36 DEGENERATION OF LUMBAR INTERVERTEBRAL DISC: Primary | ICD-10-CM

## 2019-05-06 DIAGNOSIS — M79.672 LEFT FOOT PAIN: Primary | ICD-10-CM

## 2019-05-06 DIAGNOSIS — M48.061 SPINAL STENOSIS, LUMBAR REGION, WITHOUT NEUROGENIC CLAUDICATION: ICD-10-CM

## 2019-05-06 PROCEDURE — 73630 X-RAY EXAM OF FOOT: CPT

## 2019-05-06 PROCEDURE — 97150 GROUP THERAPEUTIC PROCEDURES: CPT

## 2019-05-06 PROCEDURE — 97140 MANUAL THERAPY 1/> REGIONS: CPT

## 2019-05-08 ENCOUNTER — OFFICE VISIT (OUTPATIENT)
Dept: PHYSICAL THERAPY | Facility: CLINIC | Age: 69
End: 2019-05-08
Payer: MEDICARE

## 2019-05-08 DIAGNOSIS — M48.061 SPINAL STENOSIS, LUMBAR REGION, WITHOUT NEUROGENIC CLAUDICATION: ICD-10-CM

## 2019-05-08 DIAGNOSIS — M51.36 DEGENERATION OF LUMBAR INTERVERTEBRAL DISC: Primary | ICD-10-CM

## 2019-05-08 PROCEDURE — 97110 THERAPEUTIC EXERCISES: CPT

## 2019-05-08 PROCEDURE — 97140 MANUAL THERAPY 1/> REGIONS: CPT

## 2019-05-10 ENCOUNTER — OFFICE VISIT (OUTPATIENT)
Dept: PHYSICAL THERAPY | Facility: CLINIC | Age: 69
End: 2019-05-10
Payer: MEDICARE

## 2019-05-10 DIAGNOSIS — M51.36 DEGENERATION OF LUMBAR INTERVERTEBRAL DISC: Primary | ICD-10-CM

## 2019-05-10 DIAGNOSIS — M48.061 SPINAL STENOSIS, LUMBAR REGION, WITHOUT NEUROGENIC CLAUDICATION: ICD-10-CM

## 2019-05-10 PROCEDURE — 97110 THERAPEUTIC EXERCISES: CPT | Performed by: PHYSICAL THERAPIST

## 2019-05-10 PROCEDURE — 97140 MANUAL THERAPY 1/> REGIONS: CPT | Performed by: PHYSICAL THERAPIST

## 2019-05-20 ENCOUNTER — OFFICE VISIT (OUTPATIENT)
Dept: PHYSICAL THERAPY | Facility: CLINIC | Age: 69
End: 2019-05-20
Payer: MEDICARE

## 2019-05-20 DIAGNOSIS — M48.061 SPINAL STENOSIS, LUMBAR REGION, WITHOUT NEUROGENIC CLAUDICATION: ICD-10-CM

## 2019-05-20 DIAGNOSIS — M51.36 DEGENERATION OF LUMBAR INTERVERTEBRAL DISC: Primary | ICD-10-CM

## 2019-05-20 PROCEDURE — 97110 THERAPEUTIC EXERCISES: CPT

## 2019-05-20 PROCEDURE — 97140 MANUAL THERAPY 1/> REGIONS: CPT

## 2019-05-23 ENCOUNTER — OFFICE VISIT (OUTPATIENT)
Dept: PHYSICAL THERAPY | Facility: CLINIC | Age: 69
End: 2019-05-23
Payer: MEDICARE

## 2019-05-23 DIAGNOSIS — M48.061 SPINAL STENOSIS, LUMBAR REGION, WITHOUT NEUROGENIC CLAUDICATION: ICD-10-CM

## 2019-05-23 DIAGNOSIS — M51.36 DEGENERATION OF LUMBAR INTERVERTEBRAL DISC: Primary | ICD-10-CM

## 2019-05-23 PROCEDURE — 97150 GROUP THERAPEUTIC PROCEDURES: CPT

## 2019-05-23 PROCEDURE — 97110 THERAPEUTIC EXERCISES: CPT

## 2019-05-28 ENCOUNTER — EVALUATION (OUTPATIENT)
Dept: PHYSICAL THERAPY | Facility: CLINIC | Age: 69
End: 2019-05-28
Payer: MEDICARE

## 2019-05-28 DIAGNOSIS — M51.36 DEGENERATION OF LUMBAR INTERVERTEBRAL DISC: Primary | ICD-10-CM

## 2019-05-28 DIAGNOSIS — M48.061 SPINAL STENOSIS, LUMBAR REGION, WITHOUT NEUROGENIC CLAUDICATION: ICD-10-CM

## 2019-05-28 PROCEDURE — 97110 THERAPEUTIC EXERCISES: CPT | Performed by: PHYSICAL THERAPIST

## 2019-05-28 PROCEDURE — 97164 PT RE-EVAL EST PLAN CARE: CPT | Performed by: PHYSICAL THERAPIST

## 2019-05-31 ENCOUNTER — TRANSCRIBE ORDERS (OUTPATIENT)
Dept: PHYSICAL THERAPY | Facility: CLINIC | Age: 69
End: 2019-05-31

## 2019-05-31 DIAGNOSIS — M51.36 DEGENERATION OF LUMBAR INTERVERTEBRAL DISC: Primary | ICD-10-CM

## 2019-09-12 ENCOUNTER — APPOINTMENT (OUTPATIENT)
Dept: RADIOLOGY | Facility: CLINIC | Age: 69
End: 2019-09-12
Payer: MEDICARE

## 2019-09-12 ENCOUNTER — TRANSCRIBE ORDERS (OUTPATIENT)
Dept: URGENT CARE | Facility: CLINIC | Age: 69
End: 2019-09-12

## 2019-09-12 DIAGNOSIS — R60.9 EDEMA, UNSPECIFIED TYPE: ICD-10-CM

## 2019-09-12 DIAGNOSIS — R06.00 DOE (DYSPNEA ON EXERTION): ICD-10-CM

## 2019-09-12 DIAGNOSIS — R06.00 DOE (DYSPNEA ON EXERTION): Primary | ICD-10-CM

## 2019-09-12 PROCEDURE — 71046 X-RAY EXAM CHEST 2 VIEWS: CPT

## 2019-09-16 ENCOUNTER — TRANSCRIBE ORDERS (OUTPATIENT)
Dept: ADMINISTRATIVE | Facility: HOSPITAL | Age: 69
End: 2019-09-16

## 2019-09-16 ENCOUNTER — APPOINTMENT (OUTPATIENT)
Dept: LAB | Age: 69
End: 2019-09-16
Payer: MEDICARE

## 2019-09-16 DIAGNOSIS — I10 HYPERTENSION, UNSPECIFIED TYPE: ICD-10-CM

## 2019-09-16 DIAGNOSIS — E55.9 VITAMIN D DEFICIENCY: ICD-10-CM

## 2019-09-16 DIAGNOSIS — I10 ESSENTIAL HYPERTENSION, MALIGNANT: ICD-10-CM

## 2019-09-16 DIAGNOSIS — Z79.01 LONG TERM (CURRENT) USE OF ANTICOAGULANTS: ICD-10-CM

## 2019-09-16 DIAGNOSIS — E03.9 HYPOTHYROIDISM, UNSPECIFIED TYPE: ICD-10-CM

## 2019-09-16 DIAGNOSIS — I25.10 ASCVD (ARTERIOSCLEROTIC CARDIOVASCULAR DISEASE): ICD-10-CM

## 2019-09-16 DIAGNOSIS — E11.8 TYPE 2 DIABETES MELLITUS WITH COMPLICATION, UNSPECIFIED WHETHER LONG TERM INSULIN USE: ICD-10-CM

## 2019-09-16 DIAGNOSIS — E11.8 TYPE 2 DIABETES MELLITUS WITH COMPLICATION, UNSPECIFIED WHETHER LONG TERM INSULIN USE: Primary | ICD-10-CM

## 2019-09-16 DIAGNOSIS — R60.9 EDEMA, UNSPECIFIED TYPE: ICD-10-CM

## 2019-09-16 LAB
25(OH)D3 SERPL-MCNC: 31.2 NG/ML (ref 30–100)
ALBUMIN SERPL BCP-MCNC: 4.4 G/DL (ref 3.5–5)
ALP SERPL-CCNC: 70 U/L (ref 46–116)
ALT SERPL W P-5'-P-CCNC: 26 U/L (ref 12–78)
ANION GAP SERPL CALCULATED.3IONS-SCNC: 7 MMOL/L (ref 4–13)
AST SERPL W P-5'-P-CCNC: 17 U/L (ref 5–45)
BASOPHILS # BLD AUTO: 0.03 THOUSANDS/ΜL (ref 0–0.1)
BASOPHILS NFR BLD AUTO: 0 % (ref 0–1)
BILIRUB SERPL-MCNC: 0.63 MG/DL (ref 0.2–1)
BUN SERPL-MCNC: 17 MG/DL (ref 5–25)
CALCIUM SERPL-MCNC: 9.5 MG/DL (ref 8.3–10.1)
CHLORIDE SERPL-SCNC: 105 MMOL/L (ref 100–108)
CHOLEST SERPL-MCNC: 149 MG/DL (ref 50–200)
CO2 SERPL-SCNC: 28 MMOL/L (ref 21–32)
CREAT SERPL-MCNC: 0.85 MG/DL (ref 0.6–1.3)
EOSINOPHIL # BLD AUTO: 0.06 THOUSAND/ΜL (ref 0–0.61)
EOSINOPHIL NFR BLD AUTO: 1 % (ref 0–6)
ERYTHROCYTE [DISTWIDTH] IN BLOOD BY AUTOMATED COUNT: 12 % (ref 11.6–15.1)
EST. AVERAGE GLUCOSE BLD GHB EST-MCNC: 114 MG/DL
GFR SERPL CREATININE-BSD FRML MDRD: 70 ML/MIN/1.73SQ M
GLUCOSE P FAST SERPL-MCNC: 105 MG/DL (ref 65–99)
HBA1C MFR BLD: 5.6 % (ref 4.2–6.3)
HCT VFR BLD AUTO: 41.3 % (ref 34.8–46.1)
HDLC SERPL-MCNC: 76 MG/DL (ref 40–60)
HGB BLD-MCNC: 13.3 G/DL (ref 11.5–15.4)
IMM GRANULOCYTES # BLD AUTO: 0.03 THOUSAND/UL (ref 0–0.2)
IMM GRANULOCYTES NFR BLD AUTO: 0 % (ref 0–2)
LDLC SERPL CALC-MCNC: 54 MG/DL (ref 0–100)
LYMPHOCYTES # BLD AUTO: 1.1 THOUSANDS/ΜL (ref 0.6–4.47)
LYMPHOCYTES NFR BLD AUTO: 16 % (ref 14–44)
MCH RBC QN AUTO: 31.1 PG (ref 26.8–34.3)
MCHC RBC AUTO-ENTMCNC: 32.2 G/DL (ref 31.4–37.4)
MCV RBC AUTO: 97 FL (ref 82–98)
MONOCYTES # BLD AUTO: 0.73 THOUSAND/ΜL (ref 0.17–1.22)
MONOCYTES NFR BLD AUTO: 11 % (ref 4–12)
NEUTROPHILS # BLD AUTO: 5 THOUSANDS/ΜL (ref 1.85–7.62)
NEUTS SEG NFR BLD AUTO: 72 % (ref 43–75)
NONHDLC SERPL-MCNC: 73 MG/DL
NRBC BLD AUTO-RTO: 0 /100 WBCS
NT-PROBNP SERPL-MCNC: 58 PG/ML
PLATELET # BLD AUTO: 193 THOUSANDS/UL (ref 149–390)
PMV BLD AUTO: 11.4 FL (ref 8.9–12.7)
POTASSIUM SERPL-SCNC: 3.8 MMOL/L (ref 3.5–5.3)
PROT SERPL-MCNC: 7.4 G/DL (ref 6.4–8.2)
RBC # BLD AUTO: 4.27 MILLION/UL (ref 3.81–5.12)
SODIUM SERPL-SCNC: 140 MMOL/L (ref 136–145)
T4 FREE SERPL-MCNC: 1.34 NG/DL (ref 0.76–1.46)
TRIGL SERPL-MCNC: 97 MG/DL
TSH SERPL DL<=0.05 MIU/L-ACNC: 0.67 UIU/ML (ref 0.36–3.74)
WBC # BLD AUTO: 6.95 THOUSAND/UL (ref 4.31–10.16)

## 2019-09-16 PROCEDURE — 82306 VITAMIN D 25 HYDROXY: CPT

## 2019-09-16 PROCEDURE — 84443 ASSAY THYROID STIM HORMONE: CPT

## 2019-09-16 PROCEDURE — 36415 COLL VENOUS BLD VENIPUNCTURE: CPT

## 2019-09-16 PROCEDURE — 80053 COMPREHEN METABOLIC PANEL: CPT

## 2019-09-16 PROCEDURE — 85025 COMPLETE CBC W/AUTO DIFF WBC: CPT

## 2019-09-16 PROCEDURE — 80061 LIPID PANEL: CPT

## 2019-09-16 PROCEDURE — 83036 HEMOGLOBIN GLYCOSYLATED A1C: CPT

## 2019-09-16 PROCEDURE — 84439 ASSAY OF FREE THYROXINE: CPT

## 2019-09-16 PROCEDURE — 83880 ASSAY OF NATRIURETIC PEPTIDE: CPT

## 2020-02-28 ENCOUNTER — ANNUAL EXAM (OUTPATIENT)
Dept: OBGYN CLINIC | Facility: CLINIC | Age: 70
End: 2020-02-28
Payer: MEDICARE

## 2020-02-28 VITALS
HEART RATE: 78 BPM | HEIGHT: 65 IN | BODY MASS INDEX: 36.31 KG/M2 | OXYGEN SATURATION: 98 % | DIASTOLIC BLOOD PRESSURE: 92 MMHG | SYSTOLIC BLOOD PRESSURE: 148 MMHG

## 2020-02-28 DIAGNOSIS — Z01.419 ENCOUNTER FOR GYNECOLOGICAL EXAMINATION WITHOUT ABNORMAL FINDING: Primary | ICD-10-CM

## 2020-02-28 DIAGNOSIS — Z12.31 ENCOUNTER FOR SCREENING MAMMOGRAM FOR BREAST CANCER: ICD-10-CM

## 2020-02-28 PROCEDURE — G0101 CA SCREEN;PELVIC/BREAST EXAM: HCPCS | Performed by: NURSE PRACTITIONER

## 2020-02-28 NOTE — PROGRESS NOTES
Assessment / Plan    1  Encounter for gynecological examination without abnormal finding  Normal well woman exam  Hx of benign ENRIQUE/ BSO therfore cervical cancer screening is not indicated  Planning to update colonoscopy this year  DEXA normal    2  Encounter for screening mammogram for breast cancer  Order provided for update  She has an appointment in 2 wks    - Mammo screening bilateral w 3d & cad; Future        Subjective      Francisco Ramires is a 78 y o  female who presents for her annual gynecologic exam     Doing well, no complaints  Hx of ENRIQUE w/ BSO, benign  2019 mammo 3D normal,   DEXA 2018 normal  colonoscopy ; will be updating this year  h/o DVT    History of abnormal Pap smear: no  Family history of breast,uterine, ovarian or colon cancer: no      Menstrual History:  OB History        2    Para   2    Term                AB        Living           SAB        TAB        Ectopic        Multiple        Live Births                      No LMP recorded  Patient has had a hysterectomy  The following portions of the patient's history were reviewed and updated as appropriate: allergies, current medications, past family history, past medical history, past social history, past surgical history and problem list     Review of Systems      Review of Systems   Constitutional: Negative for chills and fever  Gastrointestinal: Negative for abdominal distention, abdominal pain, blood in stool, constipation, diarrhea, nausea and vomiting  Genitourinary: Negative for difficulty urinating, dysuria, frequency, genital sores, hematuria, menstrual problem, pelvic pain, urgency, vaginal bleeding and vaginal discharge       Breasts:  Negative for skin changes, dimpling, asymmetry, nipple discharge, redness, tenderness or palpable masses    Objective      /92 (BP Location: Left arm, Patient Position: Sitting, Cuff Size: Standard)   Pulse 78   Ht 5' 5" (1 651 m)   SpO2 98%   BMI 36 31 kg/m²      Physical Exam   Constitutional: She is oriented to person, place, and time  She appears well-developed and well-nourished  No distress  HENT:   Head: Normocephalic and atraumatic  Eyes: Pupils are equal, round, and reactive to light  Neck: Neck supple  No thyromegaly present  Pulmonary/Chest: Effort normal  Right breast exhibits no inverted nipple, no mass, no nipple discharge, no skin change and no tenderness  Left breast exhibits no inverted nipple, no mass, no nipple discharge, no skin change and no tenderness  Breasts are symmetrical    Abdominal: Soft  Normal appearance  She exhibits no mass  There is no tenderness  There is no CVA tenderness  Genitourinary: Rectum normal  Pelvic exam was performed with patient supine  No labial fusion  There is no rash, tenderness, lesion or injury on the right labia  There is no rash, tenderness, lesion or injury on the left labia  No erythema, tenderness or bleeding in the vagina  No foreign body in the vagina  No signs of injury around the vagina  No vaginal discharge found  Genitourinary Comments: Cervix, uterus and ovaries are surgically absent  Lymphadenopathy:     She has no cervical adenopathy  She has no axillary adenopathy  Right: No inguinal and no supraclavicular adenopathy present  Left: No inguinal and no supraclavicular adenopathy present  Neurological: She is alert and oriented to person, place, and time  She is not disoriented  Skin: Skin is warm, dry and intact  Psychiatric: She has a normal mood and affect   Her behavior is normal  Thought content normal

## 2020-06-29 DIAGNOSIS — Z12.31 ENCOUNTER FOR SCREENING MAMMOGRAM FOR BREAST CANCER: ICD-10-CM

## 2020-06-30 ENCOUNTER — TRANSCRIBE ORDERS (OUTPATIENT)
Dept: LAB | Facility: CLINIC | Age: 70
End: 2020-06-30

## 2020-06-30 ENCOUNTER — LAB (OUTPATIENT)
Dept: LAB | Facility: CLINIC | Age: 70
End: 2020-06-30
Payer: MEDICARE

## 2020-06-30 DIAGNOSIS — E03.9 HYPOTHYROIDISM, UNSPECIFIED TYPE: ICD-10-CM

## 2020-06-30 DIAGNOSIS — Z79.899 ENCOUNTER FOR LONG-TERM (CURRENT) USE OF OTHER MEDICATIONS: ICD-10-CM

## 2020-06-30 DIAGNOSIS — R60.9 EDEMA, UNSPECIFIED TYPE: ICD-10-CM

## 2020-06-30 DIAGNOSIS — E11.9 DIABETES MELLITUS WITHOUT COMPLICATION (HCC): ICD-10-CM

## 2020-06-30 DIAGNOSIS — E55.9 VITAMIN D DEFICIENCY: ICD-10-CM

## 2020-06-30 DIAGNOSIS — I10 HYPERTENSION, UNSPECIFIED TYPE: ICD-10-CM

## 2020-06-30 DIAGNOSIS — I10 HYPERTENSION, UNSPECIFIED TYPE: Primary | ICD-10-CM

## 2020-06-30 LAB
25(OH)D3 SERPL-MCNC: 33.7 NG/ML (ref 30–100)
ALBUMIN SERPL BCP-MCNC: 3.8 G/DL (ref 3.5–5)
ALP SERPL-CCNC: 74 U/L (ref 46–116)
ALT SERPL W P-5'-P-CCNC: 28 U/L (ref 12–78)
ANION GAP SERPL CALCULATED.3IONS-SCNC: 4 MMOL/L (ref 4–13)
AST SERPL W P-5'-P-CCNC: 21 U/L (ref 5–45)
BILIRUB SERPL-MCNC: 0.47 MG/DL (ref 0.2–1)
BUN SERPL-MCNC: 18 MG/DL (ref 5–25)
CALCIUM SERPL-MCNC: 9.6 MG/DL (ref 8.3–10.1)
CHLORIDE SERPL-SCNC: 107 MMOL/L (ref 100–108)
CO2 SERPL-SCNC: 28 MMOL/L (ref 21–32)
CREAT SERPL-MCNC: 0.85 MG/DL (ref 0.6–1.3)
ERYTHROCYTE [DISTWIDTH] IN BLOOD BY AUTOMATED COUNT: 12.1 % (ref 11.6–15.1)
GFR SERPL CREATININE-BSD FRML MDRD: 70 ML/MIN/1.73SQ M
GLUCOSE P FAST SERPL-MCNC: 108 MG/DL (ref 65–99)
HCT VFR BLD AUTO: 42.9 % (ref 34.8–46.1)
HGB BLD-MCNC: 13.7 G/DL (ref 11.5–15.4)
LDLC SERPL DIRECT ASSAY-MCNC: 59 MG/DL (ref 0–100)
MCH RBC QN AUTO: 30.6 PG (ref 26.8–34.3)
MCHC RBC AUTO-ENTMCNC: 31.9 G/DL (ref 31.4–37.4)
MCV RBC AUTO: 96 FL (ref 82–98)
NT-PROBNP SERPL-MCNC: 37 PG/ML
PLATELET # BLD AUTO: 187 THOUSANDS/UL (ref 149–390)
PMV BLD AUTO: 12.6 FL (ref 8.9–12.7)
POTASSIUM SERPL-SCNC: 4.6 MMOL/L (ref 3.5–5.3)
PROT SERPL-MCNC: 7.2 G/DL (ref 6.4–8.2)
RBC # BLD AUTO: 4.47 MILLION/UL (ref 3.81–5.12)
SODIUM SERPL-SCNC: 139 MMOL/L (ref 136–145)
T4 FREE SERPL-MCNC: 1.28 NG/DL (ref 0.76–1.46)
TRIGL SERPL-MCNC: 63 MG/DL
TSH SERPL DL<=0.05 MIU/L-ACNC: 1.52 UIU/ML (ref 0.36–3.74)
WBC # BLD AUTO: 3.94 THOUSAND/UL (ref 4.31–10.16)

## 2020-06-30 PROCEDURE — 84443 ASSAY THYROID STIM HORMONE: CPT

## 2020-06-30 PROCEDURE — 83880 ASSAY OF NATRIURETIC PEPTIDE: CPT

## 2020-06-30 PROCEDURE — 80053 COMPREHEN METABOLIC PANEL: CPT

## 2020-06-30 PROCEDURE — 83721 ASSAY OF BLOOD LIPOPROTEIN: CPT

## 2020-06-30 PROCEDURE — 36415 COLL VENOUS BLD VENIPUNCTURE: CPT

## 2020-06-30 PROCEDURE — 85027 COMPLETE CBC AUTOMATED: CPT

## 2020-06-30 PROCEDURE — 84439 ASSAY OF FREE THYROXINE: CPT

## 2020-06-30 PROCEDURE — 84478 ASSAY OF TRIGLYCERIDES: CPT

## 2020-06-30 PROCEDURE — 82306 VITAMIN D 25 HYDROXY: CPT

## 2020-07-04 ENCOUNTER — OFFICE VISIT (OUTPATIENT)
Dept: URGENT CARE | Facility: CLINIC | Age: 70
End: 2020-07-04
Payer: MEDICARE

## 2020-07-04 VITALS
TEMPERATURE: 98.5 F | HEART RATE: 79 BPM | RESPIRATION RATE: 16 BRPM | BODY MASS INDEX: 36.99 KG/M2 | HEIGHT: 65 IN | SYSTOLIC BLOOD PRESSURE: 143 MMHG | OXYGEN SATURATION: 98 % | WEIGHT: 222 LBS | DIASTOLIC BLOOD PRESSURE: 68 MMHG

## 2020-07-04 DIAGNOSIS — R31.9 URINARY TRACT INFECTION WITH HEMATURIA, SITE UNSPECIFIED: Primary | ICD-10-CM

## 2020-07-04 DIAGNOSIS — R30.0 DYSURIA: ICD-10-CM

## 2020-07-04 DIAGNOSIS — N39.0 URINARY TRACT INFECTION WITH HEMATURIA, SITE UNSPECIFIED: Primary | ICD-10-CM

## 2020-07-04 LAB
SL AMB  POCT GLUCOSE, UA: ABNORMAL
SL AMB LEUKOCYTE ESTERASE,UA: ABNORMAL
SL AMB POCT BILIRUBIN,UA: ABNORMAL
SL AMB POCT BLOOD,UA: ABNORMAL
SL AMB POCT CLARITY,UA: ABNORMAL
SL AMB POCT COLOR,UA: YELLOW
SL AMB POCT KETONES,UA: ABNORMAL
SL AMB POCT NITRITE,UA: ABNORMAL
SL AMB POCT PH,UA: 5
SL AMB POCT SPECIFIC GRAVITY,UA: 1
SL AMB POCT URINE PROTEIN: ABNORMAL
SL AMB POCT UROBILINOGEN: 0.2

## 2020-07-04 PROCEDURE — 99212 OFFICE O/P EST SF 10 MIN: CPT | Performed by: NURSE PRACTITIONER

## 2020-07-04 PROCEDURE — G0463 HOSPITAL OUTPT CLINIC VISIT: HCPCS | Performed by: NURSE PRACTITIONER

## 2020-07-04 PROCEDURE — 87086 URINE CULTURE/COLONY COUNT: CPT | Performed by: NURSE PRACTITIONER

## 2020-07-04 RX ORDER — SULFAMETHOXAZOLE AND TRIMETHOPRIM 800; 160 MG/1; MG/1
1 TABLET ORAL EVERY 12 HOURS SCHEDULED
Qty: 14 TABLET | Refills: 0 | Status: SHIPPED | OUTPATIENT
Start: 2020-07-04 | End: 2020-07-11

## 2020-07-04 RX ORDER — GABAPENTIN 300 MG/1
CAPSULE ORAL
COMMUNITY
Start: 2020-04-28 | End: 2022-03-04

## 2020-07-04 NOTE — PATIENT INSTRUCTIONS
Take the bactrim as ordered until completed  Eat yogurt or take a probiotic to restore good bacteria to your gut; this helps prevent stomach irritation/diarrhea while on an antibiotic  Urinary Tract Infection in Women   AMBULATORY CARE:   A urinary tract infection (UTI)  is caused by bacteria that get inside your urinary tract  Most bacteria that enter your urinary tract come out when you urinate  If the bacteria stay in your urinary tract, you may get an infection  Your urinary tract includes your kidneys, ureters, bladder, and urethra  Urine is made in your kidneys, and it flows from the ureters to the bladder  Urine leaves the bladder through the urethra  A UTI is more common in your lower urinary tract, which includes your bladder and urethra  Common symptoms include the following:   · Urinating more often or waking from sleep to urinate    · Pain or burning when you urinate    · Pain or pressure in your lower abdomen     · Urine that smells bad    · Blood in your urine    · Leaking urine  Seek care immediately if:   · You are urinating very little or not at all  · You have a high fever with shaking chills  · You have side or back pain that gets worse  Contact your healthcare provider if:   · You have a fever  · You do not feel better after 2 days of taking antibiotics  · You are vomiting  · You have questions or concerns about your condition or care  Treatment for a UTI  may include medicines to treat a bacterial infection  You may also need medicines to decrease pain and burning, or decrease the urge to urinate often  Prevent a UTI:   · Empty your bladder often  Urinate and empty your bladder as soon as you feel the need  Do not hold your urine for long periods of time  · Wipe from front to back after you urinate or have a bowel movement  This will help prevent germs from getting into your urinary tract through your urethra  · Drink liquids as directed    Ask how much liquid to drink each day and which liquids are best for you  You may need to drink more liquids than usual to help flush out the bacteria  Do not drink alcohol, caffeine, or citrus juices  These can irritate your bladder and increase your symptoms  Your healthcare provider may recommend cranberry juice to help prevent a UTI  · Urinate after you have sex  This can help flush out bacteria passed during sex  · Do not douche or use feminine deodorants  These can change the chemical balance in your vagina  · Change sanitary pads or tampons often  This will help prevent germs from getting into your urinary tract  · Do pelvic muscle exercises often  Pelvic muscle exercises may help you start and stop urinating  Strong pelvic muscles may help you empty your bladder easier  Squeeze these muscles tightly for 5 seconds like you are trying to hold back urine  Then relax for 5 seconds  Gradually work up to squeezing for 10 seconds  Do 3 sets of 15 repetitions a day, or as directed  Follow up with your healthcare provider as directed:  Write down your questions so you remember to ask them during your visits  © 2017 [employer] Information is for End User's use only and may not be sold, redistributed or otherwise used for commercial purposes  All illustrations and images included in CareNotes® are the copyrighted property of [employer]  or [employer]  The above information is an  only  It is not intended as medical advice for individual conditions or treatments  Talk to your doctor, nurse or pharmacist before following any medical regimen to see if it is safe and effective for you

## 2020-07-04 NOTE — PROGRESS NOTES
[facility]        NAME: Ricarda Mc is a 78 y o  female  : 1950    MRN: [id number]  DATE: 2020  TIME: 12:14 PM    Assessment and Plan   Urinary tract infection with hematuria, site unspecified [N39 0, R31 9]  1  Urinary tract infection with hematuria, site unspecified  sulfamethoxazole-trimethoprim (BACTRIM DS) 800-160 mg per tablet   2  Dysuria  POCT urine dip auto non-scope    Urine culture         Patient Instructions     Patient Instructions     Take the bactrim as ordered until completed  Eat yogurt or take a probiotic to restore good bacteria to your gut; this helps prevent stomach irritation/diarrhea while on an antibiotic  Urinary Tract Infection in Women   AMBULATORY CARE:   A urinary tract infection (UTI)  is caused by bacteria that get inside your urinary tract  Most bacteria that enter your urinary tract come out when you urinate  If the bacteria stay in your urinary tract, you may get an infection  Your urinary tract includes your kidneys, ureters, bladder, and urethra  Urine is made in your kidneys, and it flows from the ureters to the bladder  Urine leaves the bladder through the urethra  A UTI is more common in your lower urinary tract, which includes your bladder and urethra  Common symptoms include the following:   · Urinating more often or waking from sleep to urinate    · Pain or burning when you urinate    · Pain or pressure in your lower abdomen     · Urine that smells bad    · Blood in your urine    · Leaking urine  Seek care immediately if:   · You are urinating very little or not at all  · You have a high fever with shaking chills  · You have side or back pain that gets worse  Contact your healthcare provider if:   · You have a fever  · You do not feel better after 2 days of taking antibiotics  · You are vomiting  · You have questions or concerns about your condition or care    Treatment for a UTI  may include medicines to treat a bacterial infection  You may also need medicines to decrease pain and burning, or decrease the urge to urinate often  Prevent a UTI:   · Empty your bladder often  Urinate and empty your bladder as soon as you feel the need  Do not hold your urine for long periods of time  · Wipe from front to back after you urinate or have a bowel movement  This will help prevent germs from getting into your urinary tract through your urethra  · Drink liquids as directed  Ask how much liquid to drink each day and which liquids are best for you  You may need to drink more liquids than usual to help flush out the bacteria  Do not drink alcohol, caffeine, or citrus juices  These can irritate your bladder and increase your symptoms  Your healthcare provider may recommend cranberry juice to help prevent a UTI  · Urinate after you have sex  This can help flush out bacteria passed during sex  · Do not douche or use feminine deodorants  These can change the chemical balance in your vagina  · Change sanitary pads or tampons often  This will help prevent germs from getting into your urinary tract  · Do pelvic muscle exercises often  Pelvic muscle exercises may help you start and stop urinating  Strong pelvic muscles may help you empty your bladder easier  Squeeze these muscles tightly for 5 seconds like you are trying to hold back urine  Then relax for 5 seconds  Gradually work up to squeezing for 10 seconds  Do 3 sets of 15 repetitions a day, or as directed  Follow up with your healthcare provider as directed:  Write down your questions so you remember to ask them during your visits  © 2017 [employer] [employer] Information is for End User's use only and may not be sold, redistributed or otherwise used for commercial purposes  All illustrations and images included in CareNotes® are the copyrighted property of [employer]  or [employer]  The above information is an  only   It is not intended as medical advice for individual conditions or treatments  Talk to your doctor, nurse or pharmacist before following any medical regimen to see if it is safe and effective for you  Follow up with PCP in 3-5 days  Proceed to  ER if symptoms worsen  Chief Complaint     Chief Complaint   Patient presents with    Possible UTI     x 1 day         History of Present Illness       Over the last 24 hours, patient has noted some increased frequency, urgency and pelvic discomfort  She states that she thinks she has UTI, presents here to be seen  She is not currently on Keflex, and states that she only takes this before dental procedures  She states the Bactrim she took a few years ago for UTI seemed to work well  Review of Systems   Review of Systems   Constitutional: Negative for chills, fatigue and fever  Genitourinary: Positive for dysuria, frequency, pelvic pain and urgency  Negative for decreased urine volume and flank pain  All other systems reviewed and are negative          Current Medications       Current Outpatient Medications:     acetaminophen (TYLENOL) 650 mg CR tablet, Take 1,300 mg by mouth, Disp: , Rfl:     aspirin 81 mg chewable tablet, Chew 81 mg, Disp: , Rfl:     atorvastatin (LIPITOR) 80 mg tablet, Take 80 mg by mouth, Disp: , Rfl:     betamethasone, augmented, (DIPROLENE) 0 05 % lotion, Apply topically, Disp: , Rfl:     Biotin (BIOTIN 5000) 5 MG CAPS, Take 5 mg by mouth, Disp: , Rfl:     fluticasone-salmeterol (ADVAIR DISKUS) 100-50 mcg/dose, Inhale 2 puffs, Disp: , Rfl:     gabapentin (NEURONTIN) 100 mg capsule, Take 300 mg by mouth , Disp: , Rfl:     gabapentin (NEURONTIN) 300 mg capsule, , Disp: , Rfl:     levothyroxine 175 mcg tablet, Take 175 mcg by mouth, Disp: , Rfl:     loratadine (CLARITIN REDITABS) 10 MG dissolvable tablet, Take 10 mg by mouth, Disp: , Rfl:     metoprolol succinate (TOPROL-XL) 25 mg 24 hr tablet, Take 25 mg by mouth, Disp: , Rfl:     montelukast (SINGULAIR) 10 mg tablet, Take by mouth, Disp: , Rfl:     Multiple Vitamin (MULTI-VITAMIN DAILY PO), Take by mouth, Disp: , Rfl:     nitroglycerin (NITROSTAT) 0 4 mg SL tablet, Place under the tongue, Disp: , Rfl:     omeprazole (PriLOSEC) 20 mg delayed release capsule, Take by mouth, Disp: , Rfl:     spironolactone (ALDACTONE) 25 mg tablet, Take by mouth, Disp: , Rfl:     sulfamethoxazole-trimethoprim (BACTRIM DS) 800-160 mg per tablet, Take 1 tablet by mouth every 12 (twelve) hours for 7 days, Disp: 14 tablet, Rfl: 0    Current Allergies     Allergies as of 07/04/2020 - Reviewed 07/04/2020   Allergen Reaction Noted    Banana Other (See Comments) 05/10/2006    Other Other (See Comments)     Penicillins Other (See Comments) 05/10/2006    Tetracycline Other (See Comments) 05/10/2006    Tetracyclines & related  03/19/2017            The following portions of the patient's history were reviewed and updated as appropriate: allergies, current medications, past family history, past medical history, past social history, past surgical history and problem list      Past Medical History:   Diagnosis Date    Asthma     Degenerative disc disease, lumbar     Diabetes mellitus ([facility] [address] )     prediabetic    [facility]     GERD (gastroesophageal reflux disease)     Hyperlipidemia     Hypertension     Hypothyroid     Osteoarthritis     Psoriasis        Past Surgical History:   Procedure Laterality Date    CORONARY ANGIOPLASTY WITH STENT PLACEMENT      FOOT SURGERY      foot repair    JOINT REPLACEMENT Right 2006    TKA    JOINT REPLACEMENT Left 2012    TKA    KNEE SURGERY      LUMBAR FUSION      REPLACEMENT TOTAL KNEE Bilateral     SHOULDER SURGERY      TOTAL ABDOMINAL HYSTERECTOMY W/ BILATERAL SALPINGOOPHORECTOMY      WEILBY THUMB ARTHROPLASTY         Family History   Problem Relation Age of Onset    Heart failure Mother     Coronary artery disease Mother    [facility] Diabetes Mother     Hypertension Mother     Stroke Father     Hypertension Father     Heart failure Sister     Coronary artery disease Sister     Diabetes Sister     Hypertension Sister     Coronary artery disease Maternal Grandmother     Hypertension Maternal Grandmother     Coronary artery disease Maternal Grandfather     Stomach cancer Paternal Grandfather     Breast cancer Neg Hx     Colon cancer Neg Hx     Ovarian cancer Neg Hx     Uterine cancer Neg Hx          Medications have been verified  Objective   /68   Pulse 79   Temp 98 5 °F (36 9 °C)   Resp 16   Ht 5' 5" (1 651 m)   Wt 101 kg (222 lb)   SpO2 98%   BMI 36 94 kg/m²        Physical Exam     Physical Exam   Constitutional: She is oriented to person, place, and time  She appears well-developed and well-nourished  She is cooperative  Non-toxic appearance  She does not appear ill  No distress  HENT:   Head: Normocephalic and atraumatic  Eyes: Pupils are equal, round, and reactive to light  Neck: Normal range of motion  Neck supple  Pulmonary/Chest: Effort normal  No respiratory distress  Abdominal: Soft  She exhibits no distension  There is tenderness in the suprapubic area  There is no CVA tenderness  Musculoskeletal: Normal range of motion  Neurological: She is alert and oriented to person, place, and time  Skin: Skin is warm and dry  Capillary refill takes less than 2 seconds  She is not diaphoretic  Psychiatric: She has a normal mood and affect  Her speech is normal and behavior is normal  Judgment and thought content normal  Cognition and memory are normal    Nursing note and vitals reviewed

## 2020-07-05 LAB — BACTERIA UR CULT: NORMAL

## 2020-07-13 ENCOUNTER — APPOINTMENT (OUTPATIENT)
Dept: LAB | Facility: CLINIC | Age: 70
End: 2020-07-13
Payer: MEDICARE

## 2020-07-13 ENCOUNTER — TRANSCRIBE ORDERS (OUTPATIENT)
Dept: LAB | Facility: CLINIC | Age: 70
End: 2020-07-13

## 2020-07-13 DIAGNOSIS — N39.0 URINARY TRACT INFECTION WITHOUT HEMATURIA, SITE UNSPECIFIED: Primary | ICD-10-CM

## 2020-07-13 DIAGNOSIS — N39.0 URINARY TRACT INFECTION WITHOUT HEMATURIA, SITE UNSPECIFIED: ICD-10-CM

## 2020-07-13 LAB
BACTERIA UR QL AUTO: NORMAL /HPF
BILIRUB UR QL STRIP: NEGATIVE
CLARITY UR: CLEAR
COLOR UR: YELLOW
GLUCOSE UR STRIP-MCNC: NEGATIVE MG/DL
HGB UR QL STRIP.AUTO: ABNORMAL
HYALINE CASTS #/AREA URNS LPF: NORMAL /LPF
KETONES UR STRIP-MCNC: NEGATIVE MG/DL
LEUKOCYTE ESTERASE UR QL STRIP: NEGATIVE
NITRITE UR QL STRIP: NEGATIVE
NON-SQ EPI CELLS URNS QL MICRO: NORMAL /HPF
PH UR STRIP.AUTO: 6 [PH]
PROT UR STRIP-MCNC: NEGATIVE MG/DL
RBC #/AREA URNS AUTO: NORMAL /HPF
SP GR UR STRIP.AUTO: 1.01 (ref 1–1.03)
UROBILINOGEN UR QL STRIP.AUTO: 0.2 E.U./DL
WBC #/AREA URNS AUTO: NORMAL /HPF

## 2020-07-13 PROCEDURE — 87086 URINE CULTURE/COLONY COUNT: CPT

## 2020-07-13 PROCEDURE — 81001 URINALYSIS AUTO W/SCOPE: CPT | Performed by: INTERNAL MEDICINE

## 2020-07-14 LAB — BACTERIA UR CULT: NORMAL

## 2020-11-25 ENCOUNTER — OFFICE VISIT (OUTPATIENT)
Dept: URGENT CARE | Facility: CLINIC | Age: 70
End: 2020-11-25
Payer: MEDICARE

## 2020-11-25 VITALS
OXYGEN SATURATION: 98 % | DIASTOLIC BLOOD PRESSURE: 72 MMHG | SYSTOLIC BLOOD PRESSURE: 139 MMHG | HEART RATE: 84 BPM | TEMPERATURE: 97.8 F | RESPIRATION RATE: 16 BRPM

## 2020-11-25 DIAGNOSIS — R30.0 DYSURIA: Primary | ICD-10-CM

## 2020-11-25 LAB
SL AMB  POCT GLUCOSE, UA: ABNORMAL
SL AMB LEUKOCYTE ESTERASE,UA: ABNORMAL
SL AMB POCT BILIRUBIN,UA: ABNORMAL
SL AMB POCT BLOOD,UA: ABNORMAL
SL AMB POCT CLARITY,UA: CLEAR
SL AMB POCT COLOR,UA: YELLOW
SL AMB POCT KETONES,UA: ABNORMAL
SL AMB POCT NITRITE,UA: ABNORMAL
SL AMB POCT PH,UA: 5
SL AMB POCT SPECIFIC GRAVITY,UA: 1.01
SL AMB POCT URINE PROTEIN: ABNORMAL
SL AMB POCT UROBILINOGEN: 0.2

## 2020-11-25 PROCEDURE — 87086 URINE CULTURE/COLONY COUNT: CPT | Performed by: PHYSICIAN ASSISTANT

## 2020-11-25 PROCEDURE — G0463 HOSPITAL OUTPT CLINIC VISIT: HCPCS | Performed by: PHYSICIAN ASSISTANT

## 2020-11-25 PROCEDURE — 87077 CULTURE AEROBIC IDENTIFY: CPT | Performed by: PHYSICIAN ASSISTANT

## 2020-11-25 PROCEDURE — 87186 SC STD MICRODIL/AGAR DIL: CPT | Performed by: PHYSICIAN ASSISTANT

## 2020-11-25 PROCEDURE — 81002 URINALYSIS NONAUTO W/O SCOPE: CPT | Performed by: PHYSICIAN ASSISTANT

## 2020-11-25 PROCEDURE — 99213 OFFICE O/P EST LOW 20 MIN: CPT | Performed by: PHYSICIAN ASSISTANT

## 2020-11-25 RX ORDER — SULFAMETHOXAZOLE AND TRIMETHOPRIM 800; 160 MG/1; MG/1
1 TABLET ORAL EVERY 12 HOURS SCHEDULED
Qty: 10 TABLET | Refills: 0 | Status: SHIPPED | OUTPATIENT
Start: 2020-11-25 | End: 2020-11-30

## 2020-11-27 LAB — BACTERIA UR CULT: ABNORMAL

## 2021-01-09 ENCOUNTER — OFFICE VISIT (OUTPATIENT)
Dept: URGENT CARE | Facility: CLINIC | Age: 71
End: 2021-01-09
Payer: MEDICARE

## 2021-01-09 VITALS
RESPIRATION RATE: 18 BRPM | SYSTOLIC BLOOD PRESSURE: 150 MMHG | TEMPERATURE: 97.5 F | DIASTOLIC BLOOD PRESSURE: 90 MMHG | WEIGHT: 222 LBS | OXYGEN SATURATION: 98 % | HEIGHT: 65 IN | BODY MASS INDEX: 36.99 KG/M2 | HEART RATE: 89 BPM

## 2021-01-09 DIAGNOSIS — R30.0 DYSURIA: Primary | ICD-10-CM

## 2021-01-09 PROCEDURE — 99213 OFFICE O/P EST LOW 20 MIN: CPT

## 2021-01-09 PROCEDURE — 87077 CULTURE AEROBIC IDENTIFY: CPT

## 2021-01-09 PROCEDURE — 87086 URINE CULTURE/COLONY COUNT: CPT

## 2021-01-09 PROCEDURE — 87186 SC STD MICRODIL/AGAR DIL: CPT

## 2021-01-09 PROCEDURE — G0463 HOSPITAL OUTPT CLINIC VISIT: HCPCS

## 2021-01-09 RX ORDER — CEPHALEXIN 500 MG/1
500 CAPSULE ORAL
COMMUNITY

## 2021-01-09 RX ORDER — OMEPRAZOLE 20 MG/1
20 TABLET, DELAYED RELEASE ORAL DAILY
COMMUNITY
End: 2022-03-04

## 2021-01-09 RX ORDER — APIXABAN 2.5 MG/1
TABLET, FILM COATED ORAL
COMMUNITY
Start: 2020-10-22 | End: 2022-03-04

## 2021-01-09 RX ORDER — CEPHALEXIN 500 MG/1
CAPSULE ORAL
COMMUNITY
Start: 2021-01-05 | End: 2022-03-04

## 2021-01-09 RX ORDER — LEVOTHYROXINE SODIUM 0.15 MG/1
150 TABLET ORAL DAILY
COMMUNITY
Start: 2020-11-18

## 2021-01-09 RX ORDER — SULFAMETHOXAZOLE AND TRIMETHOPRIM 800; 160 MG/1; MG/1
1 TABLET ORAL EVERY 12 HOURS SCHEDULED
Qty: 14 TABLET | Refills: 0 | Status: SHIPPED | OUTPATIENT
Start: 2021-01-09 | End: 2021-01-16

## 2021-01-09 NOTE — PATIENT INSTRUCTIONS
Urine dip in office suggestive of urinary tract infection  Urine culture sent for specific antibiotics susceptibility follow-up  Please begin oral antibiotic therapy as prescribed  Increase fluid intake and avoid routinely throughout today taking time to empty completely at each episode  Please follow-up in 3-5 days with primary care doctor to discuss additional or treatment as needed

## 2021-01-09 NOTE — PROGRESS NOTES
[facility]        NAME: Airam Buck is a 69 y o  female  : 1950    MRN: [id number]  DATE: 2021  TIME: 11:14 AM    Assessment and Plan   Dysuria [R30 0]  1  Dysuria  POCT urine dip auto non-scope    Urine culture    sulfamethoxazole-trimethoprim (BACTRIM DS) 800-160 mg per tablet         Patient Instructions       Follow up with PCP in 3-5 days  Proceed to  ER if symptoms worsen  Patient Instructions   Urine dip in office suggestive of urinary tract infection  Urine culture sent for specific antibiotics susceptibility follow-up  Please begin oral antibiotic therapy as prescribed  Increase fluid intake and avoid routinely throughout today taking time to empty completely at each episode  Please follow-up in 3-5 days with primary care doctor to discuss additional or treatment as needed  Chief Complaint     Chief Complaint   Patient presents with    Possible UTI     x 1 week         History of Present Illness       69-year-old female patient here today with complaint of burning and frequency with urination x2 days  Patient has history of frequent UTIs and has been in contact with PCP to discuss possible referral to specialist in the near future  She is denying low abdominal pain, and has been afebrile  No blood visible in urine  Last positive UTI 2020      Review of Systems   Review of Systems   Constitutional: Negative for activity change, appetite change and fever  HENT: Negative for congestion, ear pain, sneezing and sore throat  Eyes: Negative for discharge and redness  Respiratory: Negative for cough, chest tightness and shortness of breath  Cardiovascular: Negative for chest pain  Gastrointestinal: Negative for abdominal pain, diarrhea, nausea and vomiting  Genitourinary: Positive for dysuria  Negative for decreased urine volume  Musculoskeletal: Negative for myalgias  Skin: Negative for rash     Allergic/Immunologic: Negative for environmental allergies  Neurological: Negative for dizziness, syncope and headaches  Hematological: Negative for adenopathy  Psychiatric/Behavioral: Negative for sleep disturbance           Current Medications       Current Outpatient Medications:     acetaminophen (TYLENOL) 650 mg CR tablet, Take 1,300 mg by mouth, Disp: , Rfl:     aspirin 81 mg chewable tablet, Chew 81 mg, Disp: , Rfl:     atorvastatin (LIPITOR) 80 mg tablet, Take 80 mg by mouth, Disp: , Rfl:     betamethasone, augmented, (DIPROLENE) 0 05 % lotion, Apply topically, Disp: , Rfl:     Biotin (BIOTIN 5000) 5 MG CAPS, Take 5 mg by mouth, Disp: , Rfl:     Eliquis 2 5 MG, , Disp: , Rfl:     fluticasone-salmeterol (ADVAIR DISKUS) 100-50 mcg/dose, Inhale 2 puffs, Disp: , Rfl:     gabapentin (NEURONTIN) 300 mg capsule, , Disp: , Rfl:     levothyroxine 150 mcg tablet, , Disp: , Rfl:     levothyroxine 175 mcg tablet, Take 175 mcg by mouth, Disp: , Rfl:     loratadine (CLARITIN REDITABS) 10 MG dissolvable tablet, Take 10 mg by mouth, Disp: , Rfl:     metoprolol succinate (TOPROL-XL) 25 mg 24 hr tablet, Take 25 mg by mouth, Disp: , Rfl:     montelukast (SINGULAIR) 10 mg tablet, Take by mouth, Disp: , Rfl:     Multiple Vitamin (MULTI-VITAMIN DAILY PO), Take by mouth, Disp: , Rfl:     nitroglycerin (NITROSTAT) 0 4 mg SL tablet, Place under the tongue, Disp: , Rfl:     omeprazole (PriLOSEC) 20 mg delayed release capsule, Take by mouth, Disp: , Rfl:     spironolactone (ALDACTONE) 25 mg tablet, Take by mouth, Disp: , Rfl:     cephalexin (KEFLEX) 500 mg capsule, , Disp: , Rfl:     cephalexin (KEFLEX) 500 mg capsule, Take 500 mg by mouth, Disp: , Rfl:     gabapentin (NEURONTIN) 100 mg capsule, Take 300 mg by mouth , Disp: , Rfl:     omeprazole (PriLOSEC OTC) 20 MG tablet, Take 20 mg by mouth daily, Disp: , Rfl:     sulfamethoxazole-trimethoprim (BACTRIM DS) 800-160 mg per tablet, Take 1 tablet by mouth every 12 (twelve) hours for 7 days, Disp: 14 tablet, Rfl: 0    Current Allergies     Allergies as of 01/09/2021 - Reviewed 01/09/2021   Allergen Reaction Noted    Banana Other (See Comments) 05/10/2006    Naproxen  09/14/2020    Other Other (See Comments)     Penicillins Other (See Comments) 05/10/2006    Tetracycline Other (See Comments) 05/10/2006    Tetracyclines & related  03/19/2017            The following portions of the patient's history were reviewed and updated as appropriate: allergies, current medications, past family history, past medical history, past social history, past surgical history and problem list      Past Medical History:   Diagnosis Date    Asthma     Degenerative disc disease, lumbar     Diabetes mellitus ([facility] [address] )     prediabetic    DVT (deep venous thrombosis) ([facility] [address] )     GERD (gastroesophageal reflux disease)     Hyperlipidemia     Hypertension     Hypothyroid     Osteoarthritis     Psoriasis        Past Surgical History:   Procedure Laterality Date    CORONARY ANGIOPLASTY WITH STENT PLACEMENT      FOOT SURGERY      foot repair    JOINT REPLACEMENT Right 2006    TKA    JOINT REPLACEMENT Left 2012    TKA    KNEE SURGERY      LUMBAR FUSION      REPLACEMENT TOTAL KNEE Bilateral     SHOULDER SURGERY      TOTAL ABDOMINAL HYSTERECTOMY W/ BILATERAL SALPINGOOPHORECTOMY      [address] THUMB ARTHROPLASTY         Family History   Problem Relation Age of Onset    Heart failure Mother     Coronary artery disease Mother     Diabetes Mother     Hypertension Mother     Stroke Father     Hypertension Father     Heart failure Sister     Coronary artery disease Sister     Diabetes Sister     Hypertension Sister     Coronary artery disease Maternal Grandmother     Hypertension Maternal Grandmother     Coronary artery disease Maternal Grandfather     Stomach cancer Paternal Grandfather     Breast cancer Neg Hx     Colon cancer Neg Hx     Ovarian cancer Neg Hx     Uterine cancer Neg Hx          Medications have been verified  Objective   /90   Pulse 89   Temp 97 5 °F (36 4 °C)   Resp 18   Ht 5' 5" (1 651 m)   Wt 101 kg (222 lb)   SpO2 98%   BMI 36 94 kg/m²   No LMP recorded  Patient has had a hysterectomy  Physical Exam     Physical Exam  Vitals signs reviewed  Constitutional:       General: She is not in acute distress  Appearance: She is well-developed and well-groomed  She is not ill-appearing  HENT:      Head: Normocephalic  Neck:      Musculoskeletal: Normal range of motion  Cardiovascular:      Rate and Rhythm: Regular rhythm  Heart sounds: Normal heart sounds, S1 normal and S2 normal    Pulmonary:      Effort: Pulmonary effort is normal       Breath sounds: Normal breath sounds and air entry  No decreased breath sounds, wheezing or rhonchi  Abdominal:      General: Bowel sounds are normal  There is no distension  Palpations: Abdomen is soft  There is no hepatomegaly, splenomegaly or mass  Tenderness: There is abdominal tenderness (mild with deep palpation) in the suprapubic area  There is no right CVA tenderness or left CVA tenderness  Musculoskeletal: Normal range of motion  Skin:     General: Skin is warm and dry  Neurological:      Mental Status: She is alert  Psychiatric:         Attention and Perception: Attention normal          Mood and Affect: Mood normal          Speech: Speech normal          Behavior: Behavior is cooperative

## 2021-01-11 LAB — BACTERIA UR CULT: ABNORMAL

## 2021-01-22 ENCOUNTER — LAB (OUTPATIENT)
Dept: LAB | Facility: CLINIC | Age: 71
End: 2021-01-22
Payer: MEDICARE

## 2021-01-22 ENCOUNTER — TRANSCRIBE ORDERS (OUTPATIENT)
Dept: LAB | Facility: CLINIC | Age: 71
End: 2021-01-22

## 2021-01-22 DIAGNOSIS — E03.9 HYPOTHYROIDISM, UNSPECIFIED TYPE: ICD-10-CM

## 2021-01-22 DIAGNOSIS — Z79.01 LONG TERM (CURRENT) USE OF ANTICOAGULANTS: ICD-10-CM

## 2021-01-22 DIAGNOSIS — Z79.899 ENCOUNTER FOR LONG-TERM (CURRENT) USE OF OTHER MEDICATIONS: ICD-10-CM

## 2021-01-22 DIAGNOSIS — E55.9 VITAMIN D DEFICIENCY: ICD-10-CM

## 2021-01-22 DIAGNOSIS — I10 HYPERTENSION, UNSPECIFIED TYPE: Primary | ICD-10-CM

## 2021-01-22 DIAGNOSIS — E11.9 TYPE 2 DIABETES MELLITUS WITHOUT COMPLICATION, WITHOUT LONG-TERM CURRENT USE OF INSULIN (HCC): ICD-10-CM

## 2021-01-22 DIAGNOSIS — E78.5 HYPERLIPIDEMIA, UNSPECIFIED HYPERLIPIDEMIA TYPE: ICD-10-CM

## 2021-01-22 LAB
25(OH)D3 SERPL-MCNC: 38.4 NG/ML (ref 30–100)
ALBUMIN SERPL BCP-MCNC: 4 G/DL (ref 3.5–5)
ALP SERPL-CCNC: 71 U/L (ref 46–116)
ALT SERPL W P-5'-P-CCNC: 34 U/L (ref 12–78)
ANION GAP SERPL CALCULATED.3IONS-SCNC: 4 MMOL/L (ref 4–13)
AST SERPL W P-5'-P-CCNC: 25 U/L (ref 5–45)
BILIRUB SERPL-MCNC: 0.43 MG/DL (ref 0.2–1)
BUN SERPL-MCNC: 24 MG/DL (ref 5–25)
CALCIUM SERPL-MCNC: 9.3 MG/DL (ref 8.3–10.1)
CHLORIDE SERPL-SCNC: 106 MMOL/L (ref 100–108)
CHOLEST SERPL-MCNC: 179 MG/DL (ref 50–200)
CO2 SERPL-SCNC: 30 MMOL/L (ref 21–32)
CREAT SERPL-MCNC: 0.86 MG/DL (ref 0.6–1.3)
ERYTHROCYTE [DISTWIDTH] IN BLOOD BY AUTOMATED COUNT: 12 % (ref 11.6–15.1)
EST. AVERAGE GLUCOSE BLD GHB EST-MCNC: 114 MG/DL
GFR SERPL CREATININE-BSD FRML MDRD: 68 ML/MIN/1.73SQ M
GLUCOSE P FAST SERPL-MCNC: 114 MG/DL (ref 65–99)
HBA1C MFR BLD: 5.6 %
HCT VFR BLD AUTO: 43.1 % (ref 34.8–46.1)
HDLC SERPL-MCNC: 90 MG/DL
HGB BLD-MCNC: 13.9 G/DL (ref 11.5–15.4)
LDLC SERPL CALC-MCNC: 75 MG/DL (ref 0–100)
MCH RBC QN AUTO: 30.7 PG (ref 26.8–34.3)
MCHC RBC AUTO-ENTMCNC: 32.3 G/DL (ref 31.4–37.4)
MCV RBC AUTO: 95 FL (ref 82–98)
NONHDLC SERPL-MCNC: 89 MG/DL
PLATELET # BLD AUTO: 209 THOUSANDS/UL (ref 149–390)
PMV BLD AUTO: 11.6 FL (ref 8.9–12.7)
POTASSIUM SERPL-SCNC: 4.6 MMOL/L (ref 3.5–5.3)
PROT SERPL-MCNC: 7.5 G/DL (ref 6.4–8.2)
RBC # BLD AUTO: 4.53 MILLION/UL (ref 3.81–5.12)
SODIUM SERPL-SCNC: 140 MMOL/L (ref 136–145)
T4 FREE SERPL-MCNC: 1.25 NG/DL (ref 0.76–1.46)
TRIGL SERPL-MCNC: 71 MG/DL
TSH SERPL DL<=0.05 MIU/L-ACNC: 0.85 UIU/ML (ref 0.36–3.74)
WBC # BLD AUTO: 4.52 THOUSAND/UL (ref 4.31–10.16)

## 2021-01-22 PROCEDURE — 80053 COMPREHEN METABOLIC PANEL: CPT

## 2021-01-22 PROCEDURE — 83036 HEMOGLOBIN GLYCOSYLATED A1C: CPT

## 2021-01-22 PROCEDURE — 80061 LIPID PANEL: CPT

## 2021-01-22 PROCEDURE — 85027 COMPLETE CBC AUTOMATED: CPT

## 2021-01-22 PROCEDURE — 82306 VITAMIN D 25 HYDROXY: CPT

## 2021-01-22 PROCEDURE — 84439 ASSAY OF FREE THYROXINE: CPT

## 2021-01-22 PROCEDURE — 36415 COLL VENOUS BLD VENIPUNCTURE: CPT

## 2021-01-22 PROCEDURE — 84443 ASSAY THYROID STIM HORMONE: CPT

## 2021-03-10 DIAGNOSIS — Z23 ENCOUNTER FOR IMMUNIZATION: ICD-10-CM

## 2021-05-26 ENCOUNTER — TRANSCRIBE ORDERS (OUTPATIENT)
Dept: LAB | Facility: CLINIC | Age: 71
End: 2021-05-26

## 2021-05-26 ENCOUNTER — APPOINTMENT (OUTPATIENT)
Dept: LAB | Facility: CLINIC | Age: 71
End: 2021-05-26
Payer: MEDICARE

## 2021-05-26 DIAGNOSIS — R06.02 SOB (SHORTNESS OF BREATH): ICD-10-CM

## 2021-05-26 DIAGNOSIS — E78.2 MIXED HYPERLIPIDEMIA: Primary | ICD-10-CM

## 2021-05-26 DIAGNOSIS — I25.10 CORONARY ARTERY DISEASE INVOLVING NATIVE CORONARY ARTERY OF NATIVE HEART WITHOUT ANGINA PECTORIS: ICD-10-CM

## 2021-05-26 DIAGNOSIS — I10 ESSENTIAL HYPERTENSION: ICD-10-CM

## 2021-05-26 LAB — NT-PROBNP SERPL-MCNC: 55 PG/ML

## 2021-05-26 PROCEDURE — 36415 COLL VENOUS BLD VENIPUNCTURE: CPT

## 2021-05-26 PROCEDURE — 83880 ASSAY OF NATRIURETIC PEPTIDE: CPT

## 2021-08-17 DIAGNOSIS — R92.8 ABNORMAL MAMMOGRAM: Primary | ICD-10-CM

## 2021-11-02 ENCOUNTER — APPOINTMENT (OUTPATIENT)
Dept: LAB | Facility: CLINIC | Age: 71
End: 2021-11-02
Payer: MEDICARE

## 2022-02-02 PROCEDURE — U0005 INFEC AGEN DETEC AMPLI PROBE: HCPCS | Performed by: INTERNAL MEDICINE

## 2022-02-02 PROCEDURE — U0003 INFECTIOUS AGENT DETECTION BY NUCLEIC ACID (DNA OR RNA); SEVERE ACUTE RESPIRATORY SYNDROME CORONAVIRUS 2 (SARS-COV-2) (CORONAVIRUS DISEASE [COVID-19]), AMPLIFIED PROBE TECHNIQUE, MAKING USE OF HIGH THROUGHPUT TECHNOLOGIES AS DESCRIBED BY CMS-2020-01-R: HCPCS | Performed by: INTERNAL MEDICINE

## 2022-03-04 ENCOUNTER — ANNUAL EXAM (OUTPATIENT)
Dept: OBGYN CLINIC | Facility: CLINIC | Age: 72
End: 2022-03-04
Payer: MEDICARE

## 2022-03-04 VITALS
BODY MASS INDEX: 38.19 KG/M2 | HEIGHT: 65 IN | DIASTOLIC BLOOD PRESSURE: 88 MMHG | SYSTOLIC BLOOD PRESSURE: 130 MMHG | WEIGHT: 229.2 LBS

## 2022-03-04 DIAGNOSIS — Z12.31 ENCOUNTER FOR SCREENING MAMMOGRAM FOR BREAST CANCER: ICD-10-CM

## 2022-03-04 DIAGNOSIS — Z01.419 ENCOUNTER FOR GYNECOLOGICAL EXAMINATION WITHOUT ABNORMAL FINDING: Primary | ICD-10-CM

## 2022-03-04 PROCEDURE — G0101 CA SCREEN;PELVIC/BREAST EXAM: HCPCS | Performed by: NURSE PRACTITIONER

## 2022-03-04 RX ORDER — BUDESONIDE AND FORMOTEROL FUMARATE DIHYDRATE 160; 4.5 UG/1; UG/1
2 AEROSOL RESPIRATORY (INHALATION) 2 TIMES DAILY
COMMUNITY

## 2022-03-04 RX ORDER — LOSARTAN POTASSIUM 25 MG/1
25 TABLET ORAL DAILY
COMMUNITY
Start: 2021-07-01

## 2022-03-04 NOTE — PROGRESS NOTES
Assessment / Plan    1  Encounter for gynecological examination without abnormal finding  Normal well woman exam  She is up to date on colonoscopy and DEXA    2  Encounter for screening mammogram for breast cancer  Order provided for 2022    - Mammo screening bilateral w 3d & cad; Future        Subjective      Diane Grissom is a 66 y o  female who presents for her annual gynecologic exam     67 yo  presents for routine GYN exam     She is doing well and has no complaints  Hx of benign ENRIQUE/BSO; ut prolapse  2021 mammo birads 0 left, dx imaging nl; rpt one year  2018 DEXA: normal  Colonoscopy- 2020    History of abnormal Pap smear: no  Family history of breast,uterine, ovarian or colon cancer: no      Menstrual History:  OB History        2    Para   2    Term   2       0    AB   0    Living   2       SAB   0    IAB   0    Ectopic   0    Multiple   0    Live Births   2           Obstetric Comments   Menopause: surgical; [birth year], 45years old              No LMP recorded  Patient has had a hysterectomy  The following portions of the patient's history were reviewed and updated as appropriate: allergies, current medications, past family history, past medical history, past social history, past surgical history and problem list     Review of Systems      Review of Systems   Constitutional: Negative for chills and fever  Respiratory: Negative for cough and shortness of breath  Gastrointestinal: Negative for abdominal distention, abdominal pain, blood in stool, constipation, diarrhea, nausea and vomiting  Genitourinary: Positive for frequency (on diuretic)  Negative for difficulty urinating, dysuria, genital sores, hematuria, menstrual problem, pelvic pain, urgency, vaginal bleeding and vaginal discharge  Musculoskeletal: Negative for arthralgias and myalgias       Breasts:  Negative for skin changes, dimpling, asymmetry, nipple discharge, redness, tenderness or palpable masses    Objective      /88 (BP Location: Right arm, Patient Position: Sitting, Cuff Size: Adult)   Ht 5' 5" (1 651 m)   Wt 104 kg (229 lb 3 2 oz)   BMI 38 14 kg/m²   Physical Exam  Constitutional:       General: She is not in acute distress  Appearance: Normal appearance  She is well-developed  She is obese  She is not ill-appearing or diaphoretic  HENT:      Head: Normocephalic and atraumatic  Eyes:      Pupils: Pupils are equal, round, and reactive to light  Neck:      Thyroid: No thyromegaly  Pulmonary:      Effort: Pulmonary effort is normal    Chest:   Breasts: Breasts are symmetrical       Right: No inverted nipple, mass, nipple discharge, skin change, tenderness or supraclavicular adenopathy  Left: No inverted nipple, mass, nipple discharge, skin change, tenderness or supraclavicular adenopathy  Abdominal:      General: There is no distension  Palpations: Abdomen is soft  There is no mass  Tenderness: There is no abdominal tenderness  There is no guarding or rebound  Genitourinary:     General: Normal vulva  Exam position: Lithotomy position  Labia:         Right: No rash, tenderness, lesion or injury  Left: No rash, tenderness, lesion or injury  Vagina: No signs of injury and foreign body  No vaginal discharge, erythema, tenderness or bleeding  Rectum: Normal       Comments: Cervix, uterus and ovaries are surgically absent  Musculoskeletal:      Cervical back: Neck supple  Lymphadenopathy:      Cervical: No cervical adenopathy  Upper Body:      Right upper body: No supraclavicular adenopathy  Left upper body: No supraclavicular adenopathy  Skin:     General: Skin is warm and dry  Neurological:      General: No focal deficit present  Mental Status: She is alert and oriented to person, place, and time  She is not disoriented     Psychiatric:         Mood and Affect: Mood normal          Behavior: Behavior normal  Thought Content:  Thought content normal          Judgment: Judgment normal

## 2022-04-21 ENCOUNTER — APPOINTMENT (OUTPATIENT)
Dept: LAB | Facility: CLINIC | Age: 72
End: 2022-04-21
Payer: MEDICARE

## 2022-04-21 DIAGNOSIS — E11.9 TYPE 2 DIABETES MELLITUS WITHOUT COMPLICATION, UNSPECIFIED WHETHER LONG TERM INSULIN USE (HCC): ICD-10-CM

## 2022-04-21 DIAGNOSIS — Z79.899 ENCOUNTER FOR LONG-TERM (CURRENT) USE OF OTHER MEDICATIONS: ICD-10-CM

## 2022-04-21 DIAGNOSIS — E03.9 HYPOTHYROIDISM, UNSPECIFIED TYPE: ICD-10-CM

## 2022-04-21 DIAGNOSIS — I10 HYPERTENSION, UNSPECIFIED TYPE: ICD-10-CM

## 2022-04-21 LAB
ALBUMIN SERPL BCP-MCNC: 3.8 G/DL (ref 3.5–5)
ALP SERPL-CCNC: 69 U/L (ref 46–116)
ALT SERPL W P-5'-P-CCNC: 31 U/L (ref 12–78)
ANION GAP SERPL CALCULATED.3IONS-SCNC: 5 MMOL/L (ref 4–13)
AST SERPL W P-5'-P-CCNC: 24 U/L (ref 5–45)
BILIRUB SERPL-MCNC: 0.43 MG/DL (ref 0.2–1)
BUN SERPL-MCNC: 19 MG/DL (ref 5–25)
CALCIUM SERPL-MCNC: 9.2 MG/DL (ref 8.3–10.1)
CHLORIDE SERPL-SCNC: 108 MMOL/L (ref 100–108)
CO2 SERPL-SCNC: 27 MMOL/L (ref 21–32)
CREAT SERPL-MCNC: 0.86 MG/DL (ref 0.6–1.3)
ERYTHROCYTE [DISTWIDTH] IN BLOOD BY AUTOMATED COUNT: 12 % (ref 11.6–15.1)
EST. AVERAGE GLUCOSE BLD GHB EST-MCNC: 117 MG/DL
GFR SERPL CREATININE-BSD FRML MDRD: 67 ML/MIN/1.73SQ M
GLUCOSE P FAST SERPL-MCNC: 106 MG/DL (ref 65–99)
HBA1C MFR BLD: 5.7 %
HCT VFR BLD AUTO: 37.8 % (ref 34.8–46.1)
HGB BLD-MCNC: 12.6 G/DL (ref 11.5–15.4)
MCH RBC QN AUTO: 30.7 PG (ref 26.8–34.3)
MCHC RBC AUTO-ENTMCNC: 33.3 G/DL (ref 31.4–37.4)
MCV RBC AUTO: 92 FL (ref 82–98)
PLATELET # BLD AUTO: 174 THOUSANDS/UL (ref 149–390)
PMV BLD AUTO: 12.4 FL (ref 8.9–12.7)
POTASSIUM SERPL-SCNC: 3.9 MMOL/L (ref 3.5–5.3)
PROT SERPL-MCNC: 7 G/DL (ref 6.4–8.2)
RBC # BLD AUTO: 4.11 MILLION/UL (ref 3.81–5.12)
SODIUM SERPL-SCNC: 140 MMOL/L (ref 136–145)
T4 FREE SERPL-MCNC: 1.26 NG/DL (ref 0.76–1.46)
TSH SERPL DL<=0.05 MIU/L-ACNC: 1.7 UIU/ML (ref 0.45–4.5)
WBC # BLD AUTO: 4.54 THOUSAND/UL (ref 4.31–10.16)

## 2022-04-21 PROCEDURE — 84443 ASSAY THYROID STIM HORMONE: CPT

## 2022-04-21 PROCEDURE — 83036 HEMOGLOBIN GLYCOSYLATED A1C: CPT

## 2022-04-21 PROCEDURE — 85027 COMPLETE CBC AUTOMATED: CPT

## 2022-04-21 PROCEDURE — 36415 COLL VENOUS BLD VENIPUNCTURE: CPT

## 2022-04-21 PROCEDURE — 80053 COMPREHEN METABOLIC PANEL: CPT

## 2022-04-21 PROCEDURE — 84439 ASSAY OF FREE THYROXINE: CPT

## 2022-08-02 ENCOUNTER — OFFICE VISIT (OUTPATIENT)
Dept: CARDIOLOGY CLINIC | Facility: CLINIC | Age: 72
End: 2022-08-02
Payer: MEDICARE

## 2022-08-02 VITALS
BODY MASS INDEX: 37.49 KG/M2 | SYSTOLIC BLOOD PRESSURE: 124 MMHG | HEIGHT: 65 IN | WEIGHT: 225 LBS | HEART RATE: 80 BPM | DIASTOLIC BLOOD PRESSURE: 82 MMHG

## 2022-08-02 DIAGNOSIS — R60.0 BILATERAL LEG EDEMA: ICD-10-CM

## 2022-08-02 DIAGNOSIS — E78.2 MIXED HYPERLIPIDEMIA: ICD-10-CM

## 2022-08-02 DIAGNOSIS — I25.10 CORONARY ARTERY DISEASE INVOLVING NATIVE CORONARY ARTERY OF NATIVE HEART WITHOUT ANGINA PECTORIS: Primary | ICD-10-CM

## 2022-08-02 PROCEDURE — 93000 ELECTROCARDIOGRAM COMPLETE: CPT | Performed by: INTERNAL MEDICINE

## 2022-08-02 PROCEDURE — 99204 OFFICE O/P NEW MOD 45 MIN: CPT | Performed by: INTERNAL MEDICINE

## 2022-08-02 RX ORDER — ALBUTEROL SULFATE 90 UG/1
2 AEROSOL, METERED RESPIRATORY (INHALATION) EVERY 6 HOURS PRN
COMMUNITY

## 2022-08-02 NOTE — PROGRESS NOTES
Patient ID: Francisco Fuller is a 66 y o  female  Plan:      Coronary disease  RCA stent 5/11/2016  No symptoms since  Hyperlipidemia  Tolerating high-intensity statin therapy and will continue current regimen  Bilateral leg edema  Chronic and mild  Likely related to venous insufficiency  Follow up Plan/Other summary comments:  One year return visit unless there are interval issues  HPI:  Patient is seen today to establish care  On 05/11/2016, because of exertional chest pressure, she underwent coronary angiography  There was tight stenosis of the mid right coronary artery which was stented  Other vessels looked okay  She has not had similar symptoms since  There is mild stable exertional dyspnea as well as tendency towards leg edema  No syncope or near syncope  Results for orders placed or performed in visit on 08/02/22   POCT ECG    Impression    NSR  RBBB  Most recent or relevant cardiac/vascular testing:    Stress echo 09/16/2019:  Normal       Past Surgical History:   Procedure Laterality Date    COLONOSCOPY  07/10/2020    wnl - no polyps, only diverticulosis; Dr Mati Ely  05/11/2016    Normal LM, normal LAD, normal LCx, 95% mid RCA with delayed antegrade flow; s/p ad hoc PC and KLAUDIA to RCA via right radial artery  0% post stent residual, PORFIRIO 3 flow      FOOT SURGERY      foot repair    JOINT REPLACEMENT Right 2006    TKA    JOINT REPLACEMENT Left 2012    TKA    KNEE SURGERY      LUMBAR FUSION      REPLACEMENT TOTAL KNEE Bilateral     SHOULDER SURGERY      TOTAL ABDOMINAL HYSTERECTOMY W/ BILATERAL SALPINGOOPHORECTOMY      WEILBY THUMB ARTHROPLASTY       CMP:   Lab Results   Component Value Date     11/05/2015    K 3 9 04/21/2022    K 4 9 11/05/2015     04/21/2022     11/05/2015    CO2 27 04/21/2022    CO2 29 9 11/05/2015    BUN 19 04/21/2022    BUN 22 11/05/2015    CREATININE 0 86 04/21/2022    CREATININE 0 81 11/05/2015    GLUCOSE 97 11/05/2015    EGFR 67 04/21/2022       Lipid Profile:   Lab Results   Component Value Date    CHOL 184 04/29/2015    TRIG 64 11/02/2021    TRIG 52 04/29/2015    HDL 86 11/02/2021    HDL 80 04/29/2015         Review of Systems   10  point ROS  was otherwise non pertinent or negative except as per HPI or as below  Gait: Normal          Objective:     /82   Pulse 80   Ht 5' 5" (1 651 m)   Wt 102 kg (225 lb)   BMI 37 44 kg/m²     PHYSICAL EXAM:    General:  Normal appearance in no distress  Eyes:  Anicteric  Oral mucosa:  Moist   Neck:  No JVD  Carotid upstrokes are brisk without bruits  No masses  Chest:  Clear to auscultation  Cardiac:  No palpable PMI  Normal S1 and S2  No murmur gallop or rub  Abdomen:  Soft and nontender  No palpable organomegaly or aortic enlargement  Extremities:  Mild venous stasis changes as well as trace peripheral edema  Musculoskeletal:  Symmetric  Vascular:  Femoral pulses are brisk without bruits  Popliteal pulses are intact bilaterally  Pedal pulses are intact  Neuro:  Grossly symmetric  Psych:  Alert and oriented x3          Current Outpatient Medications:     acetaminophen (TYLENOL) 650 mg CR tablet, Take 650 mg by mouth Take four pills daliy (2 in the am and 2 in the pm) for arthritic pain , Disp: , Rfl:     albuterol (PROVENTIL HFA,VENTOLIN HFA) 90 mcg/act inhaler, Inhale 2 puffs every 6 (six) hours as needed for wheezing, Disp: , Rfl:     aspirin 81 mg chewable tablet, Chew 81 mg daily, Disp: , Rfl:     atorvastatin (LIPITOR) 80 mg tablet, Take 80 mg by mouth daily, Disp: , Rfl:     betamethasone, augmented, (DIPROLENE) 0 05 % lotion, Apply topically if needed, Disp: , Rfl:     Biotin 2500 MCG CAPS, Take 2,500 mcg by mouth in the morning, Disp: , Rfl:     budesonide-formoterol (SYMBICORT) 160-4 5 mcg/act inhaler, Inhale 2 puffs 2 (two) times a day, Disp: , Rfl:     cephalexin (KEFLEX) 500 mg capsule, Take 500 mg by mouth  , Disp: , Rfl:     gabapentin (NEURONTIN) 100 mg capsule, Take 100 mg by mouth 3 (three) times a day, Disp: , Rfl:     levothyroxine 150 mcg tablet, Take 150 mcg by mouth daily, Disp: , Rfl:     loratadine (CLARITIN REDITABS) 10 MG dissolvable tablet, Take 10 mg by mouth daily, Disp: , Rfl:     losartan (COZAAR) 25 mg tablet, Take 25 mg by mouth daily, Disp: , Rfl:     metoprolol succinate (TOPROL-XL) 25 mg 24 hr tablet, Take 25 mg by mouth daily, Disp: , Rfl:     montelukast (SINGULAIR) 10 mg tablet, Take by mouth, Disp: , Rfl:     Multiple Vitamin (MULTI-VITAMIN DAILY PO), Take by mouth, Disp: , Rfl:     nitroglycerin (NITROSTAT) 0 4 mg SL tablet, Place under the tongue, Disp: , Rfl:     omeprazole (PriLOSEC) 20 mg delayed release capsule, Take 20 mg by mouth 2 (two) times a day, Disp: , Rfl:     spironolactone (ALDACTONE) 25 mg tablet, Take 25 mg by mouth daily, Disp: , Rfl:     fluticasone-salmeterol (Advair) 100-50 mcg/dose inhaler, Inhale 2 puffs (Patient not taking: Reported on 2022), Disp: , Rfl:   Allergies   Allergen Reactions    Banana - Food Allergy Other (See Comments)     stomach pain    Naproxen      Other reaction(s): Swelling    Other Other (See Comments)    Penicillins Other (See Comments)     ANCEF TOLERATED-unknown as per mother    Tetracycline Other (See Comments)     hives swollen painful joints    Tetracyclines & Related      Past Medical History:   Diagnosis Date    Asthma     Coronary artery disease     Degenerative disc disease, lumbar     Diabetes mellitus ([facility] [address] )     prediabetic    [facility] thrombosis[facility])     GERD (gastroesophageal reflux disease)     Hyperlipidemia     Hypertension     Hypothyroid     Osteoarthritis     Psoriasis            Social History     Tobacco Use   Smoking Status Former Smoker    Types: Cigarettes    Quit date: 0    Years since quittin 6   Smokeless Tobacco Never Used

## 2023-01-03 ENCOUNTER — APPOINTMENT (OUTPATIENT)
Dept: LAB | Facility: CLINIC | Age: 73
End: 2023-01-03

## 2023-01-03 DIAGNOSIS — E03.9 HYPOTHYROIDISM, UNSPECIFIED TYPE: ICD-10-CM

## 2023-01-03 DIAGNOSIS — I10 HYPERTENSION, UNSPECIFIED TYPE: ICD-10-CM

## 2023-01-03 DIAGNOSIS — Z79.899 ENCOUNTER FOR LONG-TERM (CURRENT) USE OF OTHER MEDICATIONS: ICD-10-CM

## 2023-01-03 DIAGNOSIS — E55.9 VITAMIN D DEFICIENCY: ICD-10-CM

## 2023-01-03 DIAGNOSIS — E78.5 HYPERLIPIDEMIA, UNSPECIFIED HYPERLIPIDEMIA TYPE: ICD-10-CM

## 2023-01-03 LAB
ERYTHROCYTE [DISTWIDTH] IN BLOOD BY AUTOMATED COUNT: 11.9 % (ref 11.6–15.1)
HCT VFR BLD AUTO: 42.7 % (ref 34.8–46.1)
HGB BLD-MCNC: 13.7 G/DL (ref 11.5–15.4)
MCH RBC QN AUTO: 30.6 PG (ref 26.8–34.3)
MCHC RBC AUTO-ENTMCNC: 32.1 G/DL (ref 31.4–37.4)
MCV RBC AUTO: 96 FL (ref 82–98)
PLATELET # BLD AUTO: 226 THOUSANDS/UL (ref 149–390)
PMV BLD AUTO: 12.1 FL (ref 8.9–12.7)
RBC # BLD AUTO: 4.47 MILLION/UL (ref 3.81–5.12)
WBC # BLD AUTO: 5.18 THOUSAND/UL (ref 4.31–10.16)

## 2023-01-04 LAB
25(OH)D3 SERPL-MCNC: 37.8 NG/ML (ref 30–100)
ALBUMIN SERPL BCP-MCNC: 3.9 G/DL (ref 3.5–5)
ALP SERPL-CCNC: 72 U/L (ref 46–116)
ALT SERPL W P-5'-P-CCNC: 32 U/L (ref 12–78)
ANION GAP SERPL CALCULATED.3IONS-SCNC: 9 MMOL/L (ref 4–13)
AST SERPL W P-5'-P-CCNC: 24 U/L (ref 5–45)
BILIRUB SERPL-MCNC: 0.47 MG/DL (ref 0.2–1)
BUN SERPL-MCNC: 19 MG/DL (ref 5–25)
CALCIUM SERPL-MCNC: 9.9 MG/DL (ref 8.3–10.1)
CHLORIDE SERPL-SCNC: 106 MMOL/L (ref 96–108)
CHOLEST SERPL-MCNC: 163 MG/DL
CO2 SERPL-SCNC: 27 MMOL/L (ref 21–32)
CREAT SERPL-MCNC: 1 MG/DL (ref 0.6–1.3)
GFR SERPL CREATININE-BSD FRML MDRD: 56 ML/MIN/1.73SQ M
GLUCOSE P FAST SERPL-MCNC: 110 MG/DL (ref 65–99)
HDLC SERPL-MCNC: 87 MG/DL
LDLC SERPL CALC-MCNC: 57 MG/DL (ref 0–100)
NONHDLC SERPL-MCNC: 76 MG/DL
POTASSIUM SERPL-SCNC: 4.7 MMOL/L (ref 3.5–5.3)
PROT SERPL-MCNC: 7.6 G/DL (ref 6.4–8.4)
SODIUM SERPL-SCNC: 142 MMOL/L (ref 135–147)
T4 FREE SERPL-MCNC: 1.34 NG/DL (ref 0.76–1.46)
TRIGL SERPL-MCNC: 97 MG/DL
TSH SERPL DL<=0.05 MIU/L-ACNC: 1.77 UIU/ML (ref 0.45–4.5)

## 2023-01-06 LAB
EST. AVERAGE GLUCOSE BLD GHB EST-MCNC: 120 MG/DL
HBA1C MFR BLD: 5.8 %

## 2023-01-30 ENCOUNTER — OFFICE VISIT (OUTPATIENT)
Dept: CARDIOLOGY CLINIC | Facility: CLINIC | Age: 73
End: 2023-01-30

## 2023-01-30 VITALS
SYSTOLIC BLOOD PRESSURE: 138 MMHG | HEIGHT: 67 IN | DIASTOLIC BLOOD PRESSURE: 72 MMHG | WEIGHT: 228 LBS | BODY MASS INDEX: 35.79 KG/M2 | HEART RATE: 88 BPM

## 2023-01-30 DIAGNOSIS — I20.9 ANGINAL SYNDROME (HCC): ICD-10-CM

## 2023-01-30 DIAGNOSIS — E78.2 MIXED HYPERLIPIDEMIA: ICD-10-CM

## 2023-01-30 DIAGNOSIS — I25.119 CORONARY ARTERY DISEASE INVOLVING NATIVE CORONARY ARTERY OF NATIVE HEART WITH ANGINA PECTORIS (HCC): Primary | ICD-10-CM

## 2023-01-30 DIAGNOSIS — I10 ESSENTIAL HYPERTENSION: ICD-10-CM

## 2023-01-30 DIAGNOSIS — E66.01 MORBID OBESITY (HCC): ICD-10-CM

## 2023-01-30 RX ORDER — DOXYCYCLINE HYCLATE 50 MG/1
50 TABLET, FILM COATED ORAL DAILY
COMMUNITY
Start: 2022-12-01

## 2023-01-30 RX ORDER — BUDESONIDE AND FORMOTEROL FUMARATE DIHYDRATE 160; 4.5 UG/1; UG/1
AEROSOL RESPIRATORY (INHALATION)
COMMUNITY

## 2023-01-30 RX ORDER — FLUTICASONE FUROATE, UMECLIDINIUM BROMIDE AND VILANTEROL TRIFENATATE 200; 62.5; 25 UG/1; UG/1; UG/1
1 POWDER RESPIRATORY (INHALATION) DAILY
COMMUNITY

## 2023-01-30 RX ORDER — LOSARTAN POTASSIUM 50 MG/1
100 TABLET ORAL DAILY
COMMUNITY
Start: 2022-11-01

## 2023-01-30 NOTE — PROGRESS NOTES
Patient ID: Ramesh Barakat is a 67 y o  female  Plan:      Anginal syndrome ([facility] [address] )  I want to be sure her current exertional dyspnea is not coronary  Stress echo will be obtained  Coronary disease  Prior RCA stent  See above comments regarding possible angina  Hyperlipidemia  Tolerating high intensity statin therapy  Follow up Plan/Other summary comments:  If the stress echo is normal then follow-up visit in 1 year  HPI: Patient is seen in follow-up today regarding the above  She has been troubled by a sense of dyspnea with mild effort and is unsure whether some sensation is pressure-like  She cannot quite tell me that it reminds her of what she experienced prior to prior stenting  On 05/11/2016, because of exertional chest pressure, she underwent coronary angiography  There was tight stenosis of the mid right coronary artery which was stented  Other vessels looked okay  Most recent or relevant cardiac/vascular testing:    Stress echo 09/16/2019:  Normal         Past Surgical History:   Procedure Laterality Date   • COLONOSCOPY  07/10/2020    wnl - no polyps, only diverticulosis; Dr Aisha Fenrandez   • CORONARY ANGIOPLASTY WITH STENT PLACEMENT  05/11/2016    Normal LM, normal LAD, normal LCx, 95% mid RCA with delayed antegrade flow; s/p ad hoc PC and KLAUDIA to RCA via right radial artery  0% post stent residual, PORFIRIO 3 flow     • FOOT SURGERY      foot repair   • JOINT REPLACEMENT Right 2006    TKA   • JOINT REPLACEMENT Left 2012    TKA   • KNEE SURGERY     • LUMBAR FUSION     • REPLACEMENT TOTAL KNEE Bilateral    • SHOULDER SURGERY     • TOTAL ABDOMINAL HYSTERECTOMY W/ BILATERAL SALPINGOOPHORECTOMY     • WEILBY THUMB ARTHROPLASTY         Lipid Profile:   Lab Results   Component Value Date    CHOL 184 04/29/2015    TRIG 97 01/03/2023    TRIG 52 04/29/2015    HDL 87 01/03/2023    HDL 80 04/29/2015         Review of Systems   10  point ROS  was otherwise non pertinent or negative except as per HPI or as below  Gait: Normal    Mild chronic edema  Objective:     /72   Pulse 88   Ht 5' 7" (1 702 m) Comment: per patient  Wt 103 kg (228 lb)   BMI 35 71 kg/m²     PHYSICAL EXAM:    General:  Normal appearance in no distress  Eyes:  Anicteric  Oral mucosa:  Moist   Neck:  No JVD  Carotid upstrokes are brisk without bruits  No masses  Chest:  Clear to auscultation  Cardiac:  No palpable PMI  Normal S1 and S2  No murmur gallop or rub  Abdomen:  Soft and nontender  No palpable organomegaly or aortic enlargement  Extremities:  Mild venous stasis changes as well as trace peripheral edema  Musculoskeletal:  Symmetric  Vascular:  Femoral pulses are brisk without bruits  Popliteal pulses are intact bilaterally  Pedal pulses are intact  Neuro:  Grossly symmetric  Psych:  Alert and oriented x3          Current Outpatient Medications:   •  acetaminophen (TYLENOL) 650 mg CR tablet, Take 650 mg by mouth Take four pills daliy (2 in the am and 2 in the pm) for arthritic pain , Disp: , Rfl:   •  albuterol (PROVENTIL HFA,VENTOLIN HFA) 90 mcg/act inhaler, Inhale 2 puffs every 6 (six) hours as needed for wheezing, Disp: , Rfl:   •  aspirin 81 mg chewable tablet, Chew 81 mg daily, Disp: , Rfl:   •  atorvastatin (LIPITOR) 80 mg tablet, Take 80 mg by mouth daily, Disp: , Rfl:   •  betamethasone, augmented, (DIPROLENE) 0 05 % lotion, Apply topically if needed, Disp: , Rfl:   •  Biotin 2500 MCG CAPS, Take 2,500 mcg by mouth in the morning, Disp: , Rfl:   •  Doxycycline Hyclate 50 MG TABS, Take 50 mg by mouth in the morning, Disp: , Rfl:   •  fluticasone-umeclidinium-vilanterol (Trelegy Ellipta) 200-62 5-25 mcg/actuation AEPB inhaler, Inhale 1 puff daily Rinse mouth after use , Disp: , Rfl:   •  gabapentin (NEURONTIN) 100 mg capsule, Take 100 mg by mouth 3 (three) times a day, Disp: , Rfl:   •  levothyroxine 150 mcg tablet, Take 150 mcg by mouth daily, Disp: , Rfl:   •  loratadine (CLARITIN REDITABS) 10 MG dissolvable tablet, Take 10 mg by mouth daily, Disp: , Rfl:   •  losartan (COZAAR) 50 mg tablet, Take 100 mg by mouth in the morning, Disp: , Rfl:   •  metoprolol succinate (TOPROL-XL) 25 mg 24 hr tablet, Take 25 mg by mouth daily, Disp: , Rfl:   •  montelukast (SINGULAIR) 10 mg tablet, Take 10 mg by mouth daily at bedtime, Disp: , Rfl:   •  Multiple Vitamin (MULTI-VITAMIN DAILY PO), Take by mouth, Disp: , Rfl:   •  omeprazole (PriLOSEC) 20 mg delayed release capsule, Take 20 mg by mouth 2 (two) times a day, Disp: , Rfl:   •  spironolactone (ALDACTONE) 25 mg tablet, Take 25 mg by mouth daily, Disp: , Rfl:   •  budesonide-formoterol (SYMBICORT) 160-4 5 mcg/act inhaler, Inhale 2 puffs 2 (two) times a day (Patient not taking: Reported on 2023), Disp: , Rfl:   •  budesonide-formoterol (SYMBICORT) 160-4 5 mcg/act inhaler, Symbicort 160 mcg-4 5 mcg/actuation HFA aerosol inhaler  Inhale 2 puff(s) twice a day by inhalation route , Disp: , Rfl:   Allergies   Allergen Reactions   • Banana - Food Allergy Other (See Comments)     stomach pain   • Naproxen      Other reaction(s): Swelling   • Other Other (See Comments)   • Penicillins Other (See Comments)     ANCEF TOLERATED-unknown as per mother   • Tetracycline Other (See Comments)     hives swollen painful joints     Past Medical History:   Diagnosis Date   • Asthma    • Coronary artery disease    • Degenerative disc disease, lumbar    • Diabetes mellitus (HCC)     prediabetic   • DVT (deep venous thrombosis) ([facility])    • GERD (gastroesophageal reflux disease)    • Hyperlipidemia    • Hypertension    • Hypothyroid    • Osteoarthritis    • Psoriasis            Social History     Tobacco Use   Smoking Status Former   • Types: Cigarettes   • Quit date:    • Years since quittin 1   Smokeless Tobacco Never

## 2023-02-07 ENCOUNTER — HOSPITAL ENCOUNTER (OUTPATIENT)
Dept: NON INVASIVE DIAGNOSTICS | Facility: CLINIC | Age: 73
Discharge: HOME | End: 2023-02-07

## 2023-02-07 VITALS
OXYGEN SATURATION: 97 % | WEIGHT: 223 LBS | DIASTOLIC BLOOD PRESSURE: 90 MMHG | RESPIRATION RATE: 16 BRPM | HEART RATE: 73 BPM | HEIGHT: 67 IN | BODY MASS INDEX: 35 KG/M2 | SYSTOLIC BLOOD PRESSURE: 160 MMHG

## 2023-02-07 DIAGNOSIS — I25.119 CORONARY ARTERY DISEASE INVOLVING NATIVE CORONARY ARTERY OF NATIVE HEART WITH ANGINA PECTORIS (HCC): ICD-10-CM

## 2023-02-07 LAB
MAX HR PERCENT: 96 %
MAX HR: 141 BPM
RATE PRESSURE PRODUCT: NORMAL
SL CV STRESS RECOVERY BP: NORMAL MMHG
SL CV STRESS RECOVERY HR: 93 BPM
SL CV STRESS RECOVERY O2 SAT: 98 %
SL CV STRESS STAGE REACHED: 2
STRESS ANGINA INDEX: 0
STRESS BASELINE BP: NORMAL MMHG
STRESS BASELINE HR: 73 BPM
STRESS O2 SAT REST: 97 %
STRESS PEAK HR: 141 BPM
STRESS POST ESTIMATED WORKLOAD: 7 METS
STRESS POST EXERCISE DUR MIN: 6 MIN
STRESS POST EXERCISE DUR SEC: 0 SEC
STRESS POST O2 SAT PEAK: 100 %
STRESS POST PEAK BP: 170 MMHG

## 2023-02-10 LAB
CHEST PAIN STATEMENT: NORMAL
ECG INTERP BEFORE EX: NORMAL
MAX DIASTOLIC BP: 90 MMHG
MAX HEART RATE: 157 BPM
MAX PREDICTED HEART RATE: 147 BPM
MAX. SYSTOLIC BP: 170 MMHG
PROTOCOL NAME: NORMAL
TARGET HR FORMULA: NORMAL
TEST INDICATION: NORMAL
TIME IN EXERCISE PHASE: NORMAL

## 2023-05-22 ENCOUNTER — EVALUATION (OUTPATIENT)
Dept: PHYSICAL THERAPY | Facility: CLINIC | Age: 73
End: 2023-05-22

## 2023-05-22 VITALS — SYSTOLIC BLOOD PRESSURE: 155 MMHG | HEART RATE: 80 BPM | DIASTOLIC BLOOD PRESSURE: 77 MMHG

## 2023-05-22 DIAGNOSIS — G89.29 CHRONIC BILATERAL LOW BACK PAIN WITH BILATERAL SCIATICA: Primary | ICD-10-CM

## 2023-05-22 DIAGNOSIS — M48.062 SPINAL STENOSIS OF LUMBAR REGION WITH NEUROGENIC CLAUDICATION: ICD-10-CM

## 2023-05-22 DIAGNOSIS — M54.42 CHRONIC BILATERAL LOW BACK PAIN WITH BILATERAL SCIATICA: Primary | ICD-10-CM

## 2023-05-22 DIAGNOSIS — M54.41 CHRONIC BILATERAL LOW BACK PAIN WITH BILATERAL SCIATICA: Primary | ICD-10-CM

## 2023-05-22 NOTE — LETTER
May 24, 2023    Rainer Young  [address] [id number]    Patient: Margueritte Claude   YOB: 1950   Date of Visit: 2023     Encounter Diagnosis     ICD-10-CM    1  Chronic bilateral low back pain with bilateral sciatica  M54 42     M54 41     G89 29       2  Spinal stenosis of lumbar region with neurogenic claudication  M48 062           Dear Dr Masood An: Thank you for your recent referral of Margueritte Claude  Please review the attached evaluation summary from Kvein's recent visit  Please verify that you agree with the plan of care by signing the attached order  If you have any questions or concerns, please do not hesitate to call  I sincerely appreciate the opportunity to share in the care of one of your patients and hope to have another opportunity to work with you in the near future  Sincerely,    Gallo Schultz, PT      Referring Provider:      I certify that I have read the below Plan of Care and certify the need for these services furnished under this plan of treatment while under my care  Rainer Young  [address] [place] [address]"[employer] [id number]  Via Fax: [phone]          PT Evaluation     Today's date: 2023  Patient name: Margueritte Claude  :   MRN: [id number]  Referring provider: Idris Lauren  Dx:   Encounter Diagnosis     ICD-10-CM    1  Chronic bilateral low back pain with bilateral sciatica  M54 42     M54 41     G89 29       2  Spinal stenosis of lumbar region with neurogenic claudication  M48 062                      Assessment  Assessment details: Margueritte Claude is a 67 y o  female who presents with complaints of chronic bilateral low back pain with bilateral sciatica and diagnosis of spinal stenosis of lumbar region with neurogenic claudication  No further referral appears necessary at this time based upon examination results   Patient presents with mild "ROM deficits of spine and hips  She does not demonstrate a directional preference  She would benefit most from spinal stabilization exercises to assist patient in achieving pain free ADLs  Prognosis is good given HEP compliance and PT 2x/wk  Please contact me if you have any questions or recommendations  Thank you for the opportunity to share in  Kevin's care  Impairments: abnormal muscle firing, abnormal or restricted ROM, activity intolerance, impaired physical strength, lacks appropriate home exercise program, pain with function and poor posture   Functional limitations: walking limited to 1 mile (onset of pain by 0 25 miles), prolonged sitting limited due to pain (unable to enjoy recreational activity such as sitting through concert), household chores limited by pain (breaks up activities over time)Understanding of Dx/Px/POC: good   Prognosis: good    Goals  STGs  1) In 4 weeks patient will report 3 points reduced pain \"at worst\"  2) In 4 weeks patient will tolerate standing 15 mins or more without increased pain  3) in 4 weeks patient will tolerate walking 1/2 mile or more without increased pain    LTGs  1) In 6-12 weeks patient will report able to sit for 1 hour or more without increased pain  2) In 6-12 weeks patient will report little difficulty with light to medium household chores  3) in 6-12 weeks patient will report little difficulty with walking 1 mile or more  4) In 6-12 weeks patient will demonstrate independence with HEP       Plan  Referral necessary: No  Planned modality interventions: cryotherapy, TENS and thermotherapy: hydrocollator packs  Planned therapy interventions: joint mobilization, manual therapy, massage, Adams taping, abdominal trunk stabilization, balance, balance/weight bearing training, neuromuscular re-education, patient education, postural training, self care, strengthening, stretching, therapeutic activities, therapeutic exercise, flexibility, functional ROM exercises, " "gait training and home exercise program  Frequency: 2x week  Duration in weeks: 12  Plan of Care beginning date: 2023  Plan of Care expiration date: 2023  Treatment plan discussed with: patient        Subjective Evaluation    History of Present Illness  Mechanism of injury: -patient c/o chronic LBP  -pain is across lower back and down b/l LEs to ankles  -denies N/T  -describes the pain as an \"ache\" and \"burn\"   -h/o lumbar fusion  -most recent MRI was in Oct 2022 with diagnosis of spinal stenosis and foraminal narrowing  -IBANCA in March only helped for one week  -PM considering a second injection if patient agreeable  -continues to take Tylenol arthritis and gabapentin daily gives her some relief, \"takes the edge off\"   -she has had pain for a long time but it is now really starting to affect her life  -when she is in pain while standing and cooking she gets relief with bending over and resting on her kitchen counter  -can only walk for about 1 mile, pain starts around 1/4 mile of walking  -is able to walk independently, uses walking stick only if she is walking through the woods to help with her balance  -cannot sit for prolonged periods, limits how long she can sit in car or sit through a concert  Pain  Current pain ratin  At best pain rating: 3  At worst pain ratin  Quality: dull ache and burning    Social Support  Stairs in house: yes (stairs to basement)   Lives in: [facility]  Lives with: spouse    Employment status: not working  Exercise history: walks 6-7 days/week      Diagnostic Tests  MRI studies: abnormal  Treatments  Previous treatment: injection treatment  Patient Goals  Patient goals for therapy: decreased pain  Patient goal: learn HEP        Objective     Concurrent Complaints  Positive for disturbed sleep   Negative for night pain, bladder dysfunction, bowel dysfunction and saddle (S4) numbness    Neurological Testing     Sensation     Lumbar   Left   Intact: light touch    Right " Intact: light touch    Reflexes   Left   Patellar (L4): normal (2+)  Achilles (S1): normal (2+)  Babinski sign: negative  Clonus sign: negative    Right   Patellar (L4): absent (0)  Achilles (S1): normal (2+)  Babinski sign: negative  Clonus sign: negative    Active Range of Motion     Lumbar   Flexion: Active lumbar flexion: fingertips 4 cm from floor  WFL  Extension:  with pain Restriction level: minimal  Left lateral flexion: Active left lumbar lateral flexion: fingertips 42 cm from floor  Right lateral flexion: Active right lumbar lateral flexion: fingertips 44 cm from floor  Left rotation:  Restriction level: minimal  Right rotation:  Restriction level: minimal  Mechanical Assessment    Cervical      Thoracic      Lumbar    Standing flexion: repeated movements   Pain location:no change  Standing extension: repeated movements  Pain location: no change    Strength/Myotome Testing     Left Hip   Planes of Motion   Flexion: 4+  Extension: 4  Abduction: 4-  Adduction: 4+  External rotation: 4-    Right Hip   Planes of Motion   Flexion: 4+  Extension: 4  Abduction: 4-  Adduction: 4+  External rotation: 4-    Left Knee   Flexion: 5  Extension: 5    Right Knee   Flexion: 5  Extension: 5    Left Ankle/Foot   Dorsiflexion: 5  Plantar flexion: 5  Great toe extension: 5    Right Ankle/Foot   Dorsiflexion: 5  Plantar flexion: 5  Great toe extension: 5    Tests     Lumbar     Left   Positive slump test    Negative crossed SLR and passive SLR  Right   Positive slump test    Negative crossed SLR and passive SLR  Left Pelvic Girdle/Sacrum   Positive: active SLR test      Right Pelvic Girdle/Sacrum   Negative: active SLR test      Left Hip   Positive DEVON  Negative long sit  Right Hip   Negative DEVON and long sit             Precautions: h/o lumbar fusion  POC exp 8/14/23    Manuals                                                                 Neuro Re-Ed             Supine TrAb bracing with pball "        PPT             TrAb bracing with iso hip abd             TrAb bracing with iso hip add             TrAb bracing with marches             TrAb bracing with dead bug             Bridges                          Seated TrAb bracing with medicine ball lifts - diagonal                          Standing TrAb bracing with TB pull downs and TB Rows             Standing TrAb bracing with TB anti-trunk rotation             Standing TrAb bracing with arm raises (shoulder flexion and abduction)                                       Ther Ex             NuStep for strength and endurance             SKTC stretch 10\"x10 ea                                                                                          Ther Activity             Fwd step ups             Lat step ups             Mini squats                                       Gait Training                                       Modalities                                                       "

## 2023-05-22 NOTE — PROGRESS NOTES
"PT Evaluation     Today's date: 2023  Patient name: Jose Fink  : [birth year]  MRN: [id number]  Referring provider: Alicja Anderson  Dx:   Encounter Diagnosis     ICD-10-CM    1  Chronic bilateral low back pain with bilateral sciatica  M54 42     M54 41     G89 29       2  Spinal stenosis of lumbar region with neurogenic claudication  M48 062                      Assessment  Assessment details: Jose Fink is a 67 y o  female who presents with complaints of chronic bilateral low back pain with bilateral sciatica and diagnosis of spinal stenosis of lumbar region with neurogenic claudication  No further referral appears necessary at this time based upon examination results  Patient presents with mild ROM deficits of spine and hips  She does not demonstrate a directional preference  She would benefit most from spinal stabilization exercises to assist patient in achieving pain free ADLs  Prognosis is good given HEP compliance and PT 2x/wk  Please contact me if you have any questions or recommendations  Thank you for the opportunity to share in  [facility] care       Impairments: abnormal muscle firing, abnormal or restricted ROM, activity intolerance, impaired physical strength, lacks appropriate home exercise program, pain with function and poor posture   Functional limitations: walking limited to 1 mile (onset of pain by 0 25 miles), prolonged sitting limited due to pain (unable to enjoy recreational activity such as sitting through concert), household chores limited by pain (breaks up activities over time)Understanding of Dx/Px/POC: good   Prognosis: good    Goals  STGs  1) In 4 weeks patient will report 3 points reduced pain \"at worst\"  2) In 4 weeks patient will tolerate standing 15 mins or more without increased pain  3) in 4 weeks patient will tolerate walking 1/2 mile or more without increased pain    LTGs  1) In 6-12 weeks patient will report able to sit for 1 hour or more without increased " "pain  2) In 6-12 weeks patient will report little difficulty with light to medium household chores  3) in 6-12 weeks patient will report little difficulty with walking 1 mile or more  4) In 6-12 weeks patient will demonstrate independence with HEP       Plan  Referral necessary: No  Planned modality interventions: cryotherapy, TENS and thermotherapy: hydrocollator packs  Planned therapy interventions: joint mobilization, manual therapy, massage, Adams taping, abdominal trunk stabilization, balance, balance/weight bearing training, neuromuscular re-education, patient education, postural training, self care, strengthening, stretching, therapeutic activities, therapeutic exercise, flexibility, functional ROM exercises, gait training and home exercise program  Frequency: 2x week  Duration in weeks: 12  Plan of Care beginning date: 5/22/2023  Plan of Care expiration date: 8/14/2023  Treatment plan discussed with: patient        Subjective Evaluation    History of Present Illness  Mechanism of injury: -patient c/o chronic LBP  -pain is across lower back and down b/l LEs to ankles  -denies N/T  -describes the pain as an \"ache\" and \"burn\"   -h/o lumbar fusion  -most recent MRI was in Oct 2022 with diagnosis of spinal stenosis and foraminal narrowing  -BIANCA in March only helped for one week  -PM considering a second injection if patient agreeable  -continues to take Tylenol arthritis and gabapentin daily gives her some relief, \"takes the edge off\"   -she has had pain for a long time but it is now really starting to affect her life  -when she is in pain while standing and cooking she gets relief with bending over and resting on her kitchen counter  -can only walk for about 1 mile, pain starts around 1/4 mile of walking  -is able to walk independently, uses walking stick only if she is walking through the woods to help with her balance  -cannot sit for prolonged periods, limits how long she can sit in car or sit through a " concert  Pain  Current pain ratin  At best pain rating: 3  At worst pain ratin  Quality: dull ache and burning    Social Support  Stairs in house: yes (stairs to basement)   Lives in: [place] house  Lives with: spouse    Employment status: not working  Exercise history: walks 6-7 days/week      Diagnostic Tests  MRI studies: abnormal  Treatments  Previous treatment: injection treatment  Patient Goals  Patient goals for therapy: decreased pain  Patient goal: learn HEP        Objective     Concurrent Complaints  Positive for disturbed sleep  Negative for night pain, bladder dysfunction, bowel dysfunction and saddle (S4) numbness    Neurological Testing     Sensation     Lumbar   Left   Intact: light touch    Right   Intact: light touch    Reflexes   Left   Patellar (L4): normal (2+)  Achilles (S1): normal (2+)  Babinski sign: negative  Clonus sign: negative    Right   Patellar (L4): absent (0)  Achilles (S1): normal (2+)  Babinski sign: negative  Clonus sign: negative    Active Range of Motion     Lumbar   Flexion: Active lumbar flexion: fingertips 4 cm from floor  WFL  Extension:  with pain Restriction level: minimal  Left lateral flexion: Active left lumbar lateral flexion: fingertips 42 cm from floor  Right lateral flexion: Active right lumbar lateral flexion: fingertips 44 cm from floor     Left rotation:  Restriction level: minimal  Right rotation:  Restriction level: minimal  Mechanical Assessment    Cervical      Thoracic      Lumbar    Standing flexion: repeated movements   Pain location:no change  Standing extension: repeated movements  Pain location: no change    Strength/Myotome Testing     Left Hip   Planes of Motion   Flexion: 4+  Extension: 4  Abduction: 4-  Adduction: 4+  External rotation: 4-    Right Hip   Planes of Motion   Flexion: 4+  Extension: 4  Abduction: 4-  Adduction: 4+  External rotation: 4-    Left Knee   Flexion: 5  Extension: 5    Right Knee   Flexion: 5  Extension: "5    Left Ankle/Foot   Dorsiflexion: 5  Plantar flexion: 5  Great toe extension: 5    Right Ankle/Foot   Dorsiflexion: 5  Plantar flexion: 5  Great toe extension: 5    Tests     Lumbar     Left   Positive slump test    Negative crossed SLR and passive SLR  Right   Positive slump test    Negative crossed SLR and passive SLR  Left Pelvic Girdle/Sacrum   Positive: active SLR test      Right Pelvic Girdle/Sacrum   Negative: active SLR test      Left Hip   Positive DEVON  Negative long sit  Right Hip   Negative DEVON and long sit              Precautions: h/o lumbar fusion  POC exp 8/14/23    Manuals                                                                 Neuro Re-Ed             Supine TrAb bracing with pball             PPT             TrAb bracing with iso hip abd             TrAb bracing with iso hip add             TrAb bracing with marches             TrAb bracing with dead bug             Bridges                          Seated TrAb bracing with medicine ball lifts - diagonal                          Standing TrAb bracing with TB pull downs and TB Rows             Standing TrAb bracing with TB anti-trunk rotation             Standing TrAb bracing with arm raises (shoulder flexion and abduction)                                       Ther Ex             NuStep for strength and endurance             SKTC stretch 10\"x10 ea                                                                                          Ther Activity             Fwd step ups             Lat step ups             Mini squats                                       Gait Training                                       Modalities                                          "

## 2023-05-26 ENCOUNTER — OFFICE VISIT (OUTPATIENT)
Dept: PHYSICAL THERAPY | Facility: CLINIC | Age: 73
End: 2023-05-26

## 2023-05-26 DIAGNOSIS — M48.062 SPINAL STENOSIS OF LUMBAR REGION WITH NEUROGENIC CLAUDICATION: ICD-10-CM

## 2023-05-26 DIAGNOSIS — M54.42 CHRONIC BILATERAL LOW BACK PAIN WITH BILATERAL SCIATICA: Primary | ICD-10-CM

## 2023-05-26 DIAGNOSIS — M54.41 CHRONIC BILATERAL LOW BACK PAIN WITH BILATERAL SCIATICA: Primary | ICD-10-CM

## 2023-05-26 DIAGNOSIS — G89.29 CHRONIC BILATERAL LOW BACK PAIN WITH BILATERAL SCIATICA: Primary | ICD-10-CM

## 2023-05-26 NOTE — PROGRESS NOTES
"Daily Note     Today's date: 2023  Patient name: Micha Gutiérrez  :   MRN: [id number]  Referring provider: Richy Pierre  Dx:   Encounter Diagnosis     ICD-10-CM    1  Chronic bilateral low back pain with bilateral sciatica  M54 42     M54 41     G89 29       2  Spinal stenosis of lumbar region with neurogenic claudication  M48 062                      Subjective: Patient would like to review her stretches  Objective: See treatment diary below      Assessment: Tolerated treatment well  Reviewed self stretches with patient and modified as needed  Patient demonstrated fatigue post treatment, exhibited good technique with therapeutic exercises and would benefit from continued PT  Provided with updated HEP  See below for details  Plan: Continue per plan of care  Progress treatment as tolerated         Precautions: h/o lumbar fusion  POC exp 23  HEP Access Code: [email or web address] - updated 23  Manuals                                                                Neuro Re-Ed             Supine TrAb bracing with pball  Red pball  5\" x20           PPT             TrAb bracing with iso hip abd  Blue TB  5\" 2x10           TrAb bracing with iso hip add  Yellow ball  5\" 2x10           TrAb bracing with marches  5\" x10 ea           TrAb bracing with dead bug  5\" x10 ea           Bridges  5\" 2x10                        Seated TrAb bracing with medicine ball lifts - diagonal  Seated on dynadisc    Red MB  Fwd lifts x10 to 90 deg           Seated alt knee ext w/TrAb bracing  Seated on dynadisc    x10 ea                        Standing TrAb bracing with TB pull downs and TB Rows  Green TB  5\" x20 ea           Standing TrAb bracing with TB anti-trunk rotation             Standing TrAb bracing with arm raises (shoulder flexion and abduction)                                       Ther Ex             NuStep for strength and endurance  L3 x10 mins           SKTC stretch 10\"x10 ea DC'd, no " stretch felt                                                                                         Ther Activity             Fwd step ups             Lat step ups             Mini squats                                       Gait Training                                       Modalities                                             Access Code: [email or web address]  URL: [email or web address]  Date: 05/26/2023  Prepared by: Warren Bowden    Exercises  - Seated Diagonal Lift with Medicine Ball  - 1 x daily - 3 x weekly - 2 sets - 10 reps  - Seated Long Arc Quad  - 1 x daily - 3 x weekly - 2 sets - 10 reps - 5 hold  - Supine Hip Adduction Isometric with Ball  - 1 x daily - 3 x weekly - 3 sets - 10 reps - 5 hold  - Hooklying Isometric Clamshell  - 1 x daily - 3 x weekly - 2 sets - 10 reps - 5 hold  - Supine March  - 1 x daily - 3 x weekly - 2 sets - 10 reps - 5 hold  - Dead Bug  - 1 x daily - 3 x weekly - 2 sets - 10 reps - 5 hold  - Shoulder Extension with Resistance  - 1 x daily - 3 x weekly - 2 sets - 10 reps - 5 hold  - Standing Bilateral Low Shoulder Row with Anchored Resistance  - 1 x daily - 3 x weekly - 2 sets - 10 reps - 5 hold

## 2023-05-31 ENCOUNTER — OFFICE VISIT (OUTPATIENT)
Dept: PHYSICAL THERAPY | Facility: CLINIC | Age: 73
End: 2023-05-31

## 2023-05-31 DIAGNOSIS — M54.42 CHRONIC BILATERAL LOW BACK PAIN WITH BILATERAL SCIATICA: Primary | ICD-10-CM

## 2023-05-31 DIAGNOSIS — M54.41 CHRONIC BILATERAL LOW BACK PAIN WITH BILATERAL SCIATICA: Primary | ICD-10-CM

## 2023-05-31 DIAGNOSIS — G89.29 CHRONIC BILATERAL LOW BACK PAIN WITH BILATERAL SCIATICA: Primary | ICD-10-CM

## 2023-05-31 DIAGNOSIS — M48.062 SPINAL STENOSIS OF LUMBAR REGION WITH NEUROGENIC CLAUDICATION: ICD-10-CM

## 2023-05-31 NOTE — PROGRESS NOTES
"Daily Note     Today's date: 2023  Patient name: Norberto Ramirez  :   MRN: [id number]  Referring provider: Irma Looney  Dx:   Encounter Diagnosis     ICD-10-CM    1  Chronic bilateral low back pain with bilateral sciatica  M54 42     M54 41     G89 29       2  Spinal stenosis of lumbar region with neurogenic claudication  M48 062                      Subjective: Pt reports she felt ok following last treatment, noted she didn't have to use shower seat in shower last night  C/o buttock pain with sitting on hard chair  Objective: See treatment diary below      Assessment: Tolerated treatment well  Patient was able to tolerate sitting on Nustep today  Gradual progression with repetitions with exercises today  Patient demonstrated fatigue post treatment, exhibited good technique with therapeutic exercises and would benefit from continued PT  May consider progressing to Blue band with standing exercises next visit  Plan: Continue per plan of care        Precautions: h/o lumbar fusion  POC exp 23  HEP Access Code: [email or web address] - updated 23  Manuals                                                               Neuro Re-Ed             Supine TrAb bracing with pball  Red pball  5\" x20 Red ball  5\" x20          PPT             TrAb bracing with iso hip abd  Blue TB  5\" 2x10 Blue  5\" 2x10          TrAb bracing with iso hip add  Yellow ball  5\" 2x10 yellow  5\"  2x10          TrAb bracing with marches  5\" x10 ea 5\" 2 x10 ea          TrAb bracing with dead bug  5\" x10 ea 5\" 2x10 ea          Bridges  5\" 2x10 5\" 2x10                       Seated TrAb bracing with medicine ball lifts - diagonal  Seated on dynadisc    Red MB  Fwd lifts x10 to 90 deg Seated on dynadisc    Red MB  Fwd lifts x10 to 90 deg          Seated alt knee ext w/TrAb bracing  Seated on dynadisc    x10 ea Seated on dynadisc    x10 ea                       Standing TrAb bracing with TB pull downs and TB Rows  Green " "TB  5\" x20 ea GTB  5\" x20ea BTB         Standing TrAb bracing with TB anti-trunk rotation             Standing TrAb bracing with arm raises (shoulder flexion and abduction)                                       Ther Ex             NuStep for strength and endurance  L3 x10 mins L3 x10 min          SKTC stretch 10\"x10 ea DC'd, no stretch felt                                                                                         Ther Activity             Fwd step ups             Lat step ups             Mini squats                                       Gait Training                                       Modalities                                            "

## 2023-06-02 ENCOUNTER — OFFICE VISIT (OUTPATIENT)
Dept: PHYSICAL THERAPY | Facility: CLINIC | Age: 73
End: 2023-06-02

## 2023-06-02 DIAGNOSIS — M54.42 CHRONIC BILATERAL LOW BACK PAIN WITH BILATERAL SCIATICA: Primary | ICD-10-CM

## 2023-06-02 DIAGNOSIS — M54.41 CHRONIC BILATERAL LOW BACK PAIN WITH BILATERAL SCIATICA: Primary | ICD-10-CM

## 2023-06-02 DIAGNOSIS — G89.29 CHRONIC BILATERAL LOW BACK PAIN WITH BILATERAL SCIATICA: Primary | ICD-10-CM

## 2023-06-02 DIAGNOSIS — M48.062 SPINAL STENOSIS OF LUMBAR REGION WITH NEUROGENIC CLAUDICATION: ICD-10-CM

## 2023-06-02 NOTE — PROGRESS NOTES
"Daily Note     Today's date: 2023  Patient name: Kevon Jay  :   MRN: [id number]  Referring provider: Shante Valentino  Dx:   Encounter Diagnosis     ICD-10-CM    1  Chronic bilateral low back pain with bilateral sciatica  M54 42     M54 41     G89 29       2  Spinal stenosis of lumbar region with neurogenic claudication  M48 062                      Subjective: Pt reported that she has been compliant with her HEP  She only has minimal pain during seated lumbar stabilization exercises  Objective: See treatment diary below      Assessment: Added weight during multiple exercises and initiated anti-trunk rotation using TB to facilitate core strength  Tolerated treatment well  Patient demonstrated fatigue post treatment, exhibited good technique with therapeutic exercises and would benefit from continued PT      Plan: Continue per plan of care  Progress treatment as tolerated         Precautions: h/o lumbar fusion  POC exp 23  HEP Access Code: [email or web address] - updated 23  Manuals                                     Neuro Re-Ed        Supine TrAb bracing with pball  Red pball  5\" x20 Red ball  5\" x20 Red ball  5\" x20    PPT        TrAb bracing with iso hip abd  Blue TB  5\" 2x10 Blue  5\" 2x10 Blue  5\" 2x10    TrAb bracing with iso hip add  Yellow ball  5\" 2x10 yellow  5\"  2x10 yellow  5\"  2x10    TrAb bracing with marches  5\" x10 ea 5\" 2 x10 ea 2# 5\" 2x10 ea    TrAb bracing with dead bug  5\" x10 ea 5\" 2x10 ea 2#/2# 5\" 2x10 ea    Bridges  5\" 2x10 5\" 2x10 5\" 2x10            Seated TrAb bracing with medicine ball lifts - diagonal  Seated on dynadisc    Red MB  Fwd lifts x10 to 90 deg Seated on dynadisc    Red MB  Fwd lifts x10 to 90 deg Resume NV    Seated alt knee ext w/TrAb bracing  Seated on dynadisc    x10 ea Seated on dynadisc    x10 ea Seated on dynadisc    x10 ea            Standing TrAb bracing with TB pull downs and TB Rows  Green TB  5\" x20 ea GTB  5\" x20 ea BTB  5\" " "x20 ea    Standing TrAb bracing with TB anti-trunk rotation    GTB 5\" x10 ea dir    Standing TrAb bracing with arm raises (shoulder flexion and abduction)                        Ther Ex        NuStep for strength and endurance  L3 x10 mins L3 x10 min L3 x10 min    SKTC stretch 10\"x10 ea DC'd, no stretch felt                                                      Ther Activity        Fwd step ups        Lat step ups        Mini squats                        Gait Training                        Modalities                             "

## 2023-06-05 ENCOUNTER — OFFICE VISIT (OUTPATIENT)
Dept: PHYSICAL THERAPY | Facility: CLINIC | Age: 73
End: 2023-06-05
Payer: MEDICARE

## 2023-06-05 DIAGNOSIS — M54.42 CHRONIC BILATERAL LOW BACK PAIN WITH BILATERAL SCIATICA: Primary | ICD-10-CM

## 2023-06-05 DIAGNOSIS — M48.062 SPINAL STENOSIS OF LUMBAR REGION WITH NEUROGENIC CLAUDICATION: ICD-10-CM

## 2023-06-05 DIAGNOSIS — G89.29 CHRONIC BILATERAL LOW BACK PAIN WITH BILATERAL SCIATICA: Primary | ICD-10-CM

## 2023-06-05 DIAGNOSIS — M54.41 CHRONIC BILATERAL LOW BACK PAIN WITH BILATERAL SCIATICA: Primary | ICD-10-CM

## 2023-06-05 PROCEDURE — 97112 NEUROMUSCULAR REEDUCATION: CPT

## 2023-06-05 PROCEDURE — 97110 THERAPEUTIC EXERCISES: CPT

## 2023-06-05 NOTE — PROGRESS NOTES
"Daily Note     Today's date: 2023  Patient name: Mona Shay  :   MRN: [id number]  Referring provider: Arielle Zuluaga  Dx:   Encounter Diagnosis     ICD-10-CM    1  Chronic bilateral low back pain with bilateral sciatica  M54 42     M54 41     G89 29       2  Spinal stenosis of lumbar region with neurogenic claudication  M48 062                      Subjective: Pt reported that her arms were fatigue after LV from multiple TB exercises  She states she had minor discomfort/soreness in her L/S area prior to PT  She blames it on cleaning windows this weekend  Objective: See treatment diary below      Assessment: Tolerated treatment well  Patient demonstrated fatigue post treatment, exhibited good technique with therapeutic exercises and would benefit from continued PT      Plan: Continue per plan of care  Progress treatment as tolerated         Precautions: h/o lumbar fusion  POC exp 23  HEP Access Code: [email or web address] - updated 23  Manuals                                    Neuro Re-Ed        Supine TrAb bracing with pball  Red pball  5\" x20 Red ball  5\" x20 Red ball  5\" x20 Red ball  5\" x20   PPT        TrAb bracing with iso hip abd  Blue TB  5\" 2x10 Blue  5\" 2x10 Blue  5\" 2x10 Blue  5\" 2x10   TrAb bracing with iso hip add  Yellow ball  5\" 2x10 yellow  5\"  2x10 yellow  5\"  2x10 yellow  5\"  2x10   TrAb bracing with marches  5\" x10 ea 5\" 2 x10 ea 2# 5\" 2x10 ea 2# 5\" 2x10 ea   TrAb bracing with dead bug  5\" x10 ea 5\" 2x10 ea 2#/2# 5\" 2x10 ea 2#/2# 5\" 2x10 ea   Bridges  5\" 2x10 5\" 2x10 5\" 2x10 5\" 2x10           Seated TrAb bracing with medicine ball lifts - diagonal  Seated on dynadisc    Red MB  Fwd lifts x10 to 90 deg Seated on dynadisc    Red MB  Fwd lifts x10 to 90 deg Resume NV    Seated alt knee ext w/TrAb bracing  Seated on dynadisc    x10 ea Seated on dynadisc    x10 ea Seated on dynadisc    x10 ea Seated on dynadisc    2# x10 ea           Standing TrAb bracing " "with TB pull downs and TB Rows  Green TB  5\" x20 ea GTB  5\" x20 ea BTB  5\" x20 ea BTB  5\" x20 ea   Standing TrAb bracing with TB anti-trunk rotation    GTB 5\" x10 ea dir GTB 5\" x10 ea dir   Standing TrAb bracing with arm raises (shoulder flexion and abduction)                        Ther Ex        NuStep for strength and endurance  L3 x10 mins L3 x10 min L3 x10 min L4 x10 min   SKTC stretch 10\"x10 ea DC'd, no stretch felt                                                      Ther Activity        Fwd step ups        Lat step ups        Mini squats                        Gait Training                        Modalities                             "

## 2023-06-08 ENCOUNTER — OFFICE VISIT (OUTPATIENT)
Dept: PHYSICAL THERAPY | Facility: CLINIC | Age: 73
End: 2023-06-08
Payer: MEDICARE

## 2023-06-08 DIAGNOSIS — G89.29 CHRONIC BILATERAL LOW BACK PAIN WITH BILATERAL SCIATICA: Primary | ICD-10-CM

## 2023-06-08 DIAGNOSIS — M54.42 CHRONIC BILATERAL LOW BACK PAIN WITH BILATERAL SCIATICA: Primary | ICD-10-CM

## 2023-06-08 DIAGNOSIS — M48.062 SPINAL STENOSIS OF LUMBAR REGION WITH NEUROGENIC CLAUDICATION: ICD-10-CM

## 2023-06-08 DIAGNOSIS — M54.41 CHRONIC BILATERAL LOW BACK PAIN WITH BILATERAL SCIATICA: Primary | ICD-10-CM

## 2023-06-08 PROCEDURE — 97112 NEUROMUSCULAR REEDUCATION: CPT

## 2023-06-08 PROCEDURE — 97110 THERAPEUTIC EXERCISES: CPT

## 2023-06-08 NOTE — PROGRESS NOTES
"Daily Note     Today's date: 2023  Patient name: Chel Taylor  :   MRN: [id number]  Referring provider: Toby Kumar  Dx:   Encounter Diagnosis     ICD-10-CM    1  Chronic bilateral low back pain with bilateral sciatica  M54 42     M54 41     G89 29       2  Spinal stenosis of lumbar region with neurogenic claudication  M48 062                      Subjective: Pt reported that she was stiff prior to PT  She did c/o R knee pain after last visit  She believes that the weight during LAQ bothered it  Objective: See treatment diary below      Assessment: Increased reps during multiple exercises to facilitate endurance  Held weight during LAQ  Tolerated treatment well  Patient demonstrated fatigue post treatment, exhibited good technique with therapeutic exercises and would benefit from continued PT      Plan: Continue per plan of care  Progress treatment as tolerated         Precautions: h/o lumbar fusion  POC exp 23  HEP Access Code: [email or web address] - updated 23  Manuals                                    Neuro Re-Ed        Supine TrAb bracing with pball Red ball  5\" x30 Red pball  5\" x20 Red ball  5\" x20 Red ball  5\" x20 Red ball  5\" x20   PPT        TrAb bracing with iso hip abd Blue  5\" 3x10 Blue TB  5\" 2x10 Blue  5\" 2x10 Blue  5\" 2x10 Blue  5\" 2x10   TrAb bracing with iso hip add yellow  5\"  3x10 Yellow ball  5\" 2x10 yellow  5\"  2x10 yellow  5\"  2x10 yellow  5\"  2x10   TrAb bracing with marches 2# 5\" 3x10 ea 5\" x10 ea 5\" 2 x10 ea 2# 5\" 2x10 ea 2# 5\" 2x10 ea   TrAb bracing with dead bug 2#/2# 5\" 3x10 ea 5\" x10 ea 5\" 2x10 ea 2#/2# 5\" 2x10 ea 2#/2# 5\" 2x10 ea   Bridges 5\" 3x10 5\" 2x10 5\" 2x10 5\" 2x10 5\" 2x10           Seated TrAb bracing with medicine ball lifts - diagonal  Seated on dynadisc    Red MB  Fwd lifts x10 to 90 deg Seated on dynadisc    Red MB  Fwd lifts x10 to 90 deg Resume NV    Seated alt knee ext w/TrAb bracing Seated on Respicardiaadisc    2x10 ea Seated on " "dynadisc    x10 ea Seated on dynadisc    x10 ea Seated on dynadisc    x10 ea Seated on dynadisc    2# x10 ea           Standing TrAb bracing with TB pull downs and TB Rows BTB  5\" x20 ea Green TB  5\" x20 ea GTB  5\" x20 ea BTB  5\" x20 ea BTB  5\" x20 ea   Standing TrAb bracing with TB anti-trunk rotation GTB 5\" x10 ea dir   GTB 5\" x10 ea dir GTB 5\" x10 ea dir   Standing TrAb bracing with arm raises (shoulder flexion and abduction)                        Ther Ex        NuStep for strength and endurance L4 x10 min L3 x10 mins L3 x10 min L3 x10 min L4 x10 min   SKTC stretch  DC'd, no stretch felt                                                      Ther Activity        Fwd step ups        Lat step ups        Mini squats                        Gait Training                        Modalities                             "

## 2023-06-12 ENCOUNTER — OFFICE VISIT (OUTPATIENT)
Dept: PHYSICAL THERAPY | Facility: CLINIC | Age: 73
End: 2023-06-12
Payer: MEDICARE

## 2023-06-12 DIAGNOSIS — M48.062 SPINAL STENOSIS OF LUMBAR REGION WITH NEUROGENIC CLAUDICATION: ICD-10-CM

## 2023-06-12 DIAGNOSIS — G89.29 CHRONIC BILATERAL LOW BACK PAIN WITH BILATERAL SCIATICA: Primary | ICD-10-CM

## 2023-06-12 DIAGNOSIS — M54.42 CHRONIC BILATERAL LOW BACK PAIN WITH BILATERAL SCIATICA: Primary | ICD-10-CM

## 2023-06-12 DIAGNOSIS — M54.41 CHRONIC BILATERAL LOW BACK PAIN WITH BILATERAL SCIATICA: Primary | ICD-10-CM

## 2023-06-12 PROCEDURE — 97112 NEUROMUSCULAR REEDUCATION: CPT | Performed by: PHYSICAL THERAPIST

## 2023-06-12 PROCEDURE — 97110 THERAPEUTIC EXERCISES: CPT | Performed by: PHYSICAL THERAPIST

## 2023-06-12 NOTE — PROGRESS NOTES
"Daily Note     Today's date: 2023  Patient name: Sedrick Spence  :   MRN: [id number]  Referring provider: Elvira Mckinney  Dx:   Encounter Diagnosis     ICD-10-CM    1  Chronic bilateral low back pain with bilateral sciatica  M54 42     M54 41     G89 29       2  Spinal stenosis of lumbar region with neurogenic claudication  M48 062                      Subjective: Pt notes that she is a little stiff this morning, which typically comes with mornings  Objective: See treatment diary below      Assessment: Tolerated treatment well  Patient demonstrated fatigue post treatment and would benefit from continued PT  She continues to remain challenged with core stability in functional position such as standing and sitting  Plan: Continue per plan of care  Progress treatment as tolerated         Precautions: h/o lumbar fusion  POC exp 23  HEP Access Code: [email or web address] - updated 23  Manuals                                    Neuro Re-Ed        Supine TrAb bracing with pball Red ball  5\" x30 Red pball  5\" x20 Red ball  5\" x20 Red ball  5\" x20 Red ball  5\" x20   PPT        TrAb bracing with iso hip abd Blue  5\" 3x10 Blue TB  5\" 3x10 Blue  5\" 2x10 Blue  5\" 2x10 Blue  5\" 2x10   TrAb bracing with iso hip add yellow  5\"  3x10 Yellow ball  5\" 2x10 yellow  5\"  2x10 yellow  5\"  2x10 yellow  5\"  2x10   TrAb bracing with marches 2# 5\" 3x10 ea 2# 5\" 3x10 ea 5\" 2 x10 ea 2# 5\" 2x10 ea 2# 5\" 2x10 ea   TrAb bracing with dead bug 2#/2# 5\" 3x10 ea 2# 5\" 3x10 ea 5\" 2x10 ea 2#/2# 5\" 2x10 ea 2#/2# 5\" 2x10 ea   Bridges 5\" 3x10 5\" 3x10 5\" 2x10 5\" 2x10 5\" 2x10           Seated TrAb bracing with medicine ball lifts - diagonal   Seated on dynadisc    Red MB  Fwd lifts x10 to 90 deg Resume NV    Seated alt knee ext w/TrAb bracing Seated on dynadisc    2x10 ea Seated on dynadisc    2x10 ea Seated on dynadisc    x10 ea Seated on dynadisc    x10 ea Seated on dynadisc    2# x10 ea           Standing " "TrAb bracing with TB pull downs and TB Rows BTB  5\" x20 ea BTB  5\" x20 ea GTB  5\" x20 ea BTB  5\" x20 ea BTB  5\" x20 ea   Standing TrAb bracing with TB anti-trunk rotation GTB 5\" x10 ea dir GTB 5\" x10 ea dir  GTB 5\" x10 ea dir GTB 5\" x10 ea dir   Standing TrAb bracing with arm raises (shoulder flexion and abduction)                        Ther Ex        NuStep for strength and endurance L4 x10 min L5 x10 mins L3 x10 min L3 x10 min L4 x10 min   SKTC stretch                                                        Ther Activity        Fwd step ups        Lat step ups        Mini squats                        Gait Training                        Modalities                             "

## 2023-06-15 ENCOUNTER — OFFICE VISIT (OUTPATIENT)
Dept: PHYSICAL THERAPY | Facility: CLINIC | Age: 73
End: 2023-06-15
Payer: MEDICARE

## 2023-06-15 DIAGNOSIS — M48.062 SPINAL STENOSIS OF LUMBAR REGION WITH NEUROGENIC CLAUDICATION: ICD-10-CM

## 2023-06-15 DIAGNOSIS — M54.41 CHRONIC BILATERAL LOW BACK PAIN WITH BILATERAL SCIATICA: Primary | ICD-10-CM

## 2023-06-15 DIAGNOSIS — G89.29 CHRONIC BILATERAL LOW BACK PAIN WITH BILATERAL SCIATICA: Primary | ICD-10-CM

## 2023-06-15 DIAGNOSIS — M54.42 CHRONIC BILATERAL LOW BACK PAIN WITH BILATERAL SCIATICA: Primary | ICD-10-CM

## 2023-06-15 PROCEDURE — 97110 THERAPEUTIC EXERCISES: CPT

## 2023-06-15 NOTE — PROGRESS NOTES
"Daily Note     Today's date: 6/15/2023  Patient name: Sedrick Spence  :   MRN: [id number]  Referring provider: Elvira Mckinney  Dx:   Encounter Diagnosis     ICD-10-CM    1  Chronic bilateral low back pain with bilateral sciatica  M54 42     M54 41     G89 29       2  Spinal stenosis of lumbar region with neurogenic claudication  M48 062                      Subjective: Pt reports she doesn't notice significant improvement with therapy yet  Objective: See treatment diary below      Assessment: Tolerated treatment well  Reduced resistance as needed to perform exercises pain free  Patient c/o increased  LBP with supine marches and with Tband resisted anti-trunk rotatiion exercises today  Encouraged patient to continue with HEP as tolerated, stopping if painful or new symptoms arise  Patient demonstrated fatigue post treatment, exhibited good technique with therapeutic exercises and would benefit from continued PT      Plan: Continue per plan of care        Precautions: h/o lumbar fusion  POC exp 23  HEP Access Code: [email or web address] - updated 23  Manuals 6/8 6/12 6/15 6/2 6/5                                   Neuro Re-Ed        Supine TrAb bracing with pball Red ball  5\" x30 Red pball  5\" x20 Red ball  5\" x20 Red ball  5\" x20 Red ball  5\" x20   PPT        TrAb bracing with iso hip abd Blue  5\" 3x10 Blue TB  5\" 3x10 Black  5\" 3x10 Blue  5\" 2x10 Blue  5\" 2x10   TrAb bracing with iso hip add yellow  5\"  3x10 Yellow ball  5\" 2x10 yellow  5\"  3x10 yellow  5\"  2x10 yellow  5\"  2x10   TrAb bracing with marches 2# 5\" 3x10 ea 2# 5\" 3x10 ea 2# 5\" 3 x10 ea 2# 5\" 2x10 ea 2# 5\" 2x10 ea   TrAb bracing with dead bug 2#/2# 5\" 3x10 ea 2# 5\" 3x10 ea np 2#/2# 5\" 2x10 ea 2#/2# 5\" 2x10 ea   Bridges 5\" 3x10 5\" 3x10 5\" 3x10 5\" 2x10 5\" 2x10           Seated TrAb bracing with medicine ball lifts - diagonal    Resume NV    Seated alt knee ext w/TrAb bracing Seated on dynadisc    2x10 ea Seated on dynadisc    2x10 ea Seated " "on dynadisc    2x10 ea Seated on dynadisc    x10 ea Seated on dynadisc    2# x10 ea           Standing TrAb bracing with TB pull downs and TB Rows BTB  5\" x20 ea BTB  5\" x20 ea BlueTB  5\" x20 ea BTB  5\" x20 ea BTB  5\" x20 ea   Standing TrAb bracing with TB anti-trunk rotation GTB 5\" x10 ea dir GTB 5\" x10 ea dir GTB 5\" x10 ea dir GTB 5\" x10 ea dir GTB 5\" x10 ea dir   Standing TrAb bracing with arm raises (shoulder flexion and abduction)                        Ther Ex        NuStep for strength and endurance L4 x10 min L5 x10 mins L5 x10 min L3 x10 min L4 x10 min   SKTC stretch                                                        Ther Activity        Fwd step ups        Lat step ups        Mini squats                        Gait Training                        Modalities                             "

## 2023-06-19 ENCOUNTER — OFFICE VISIT (OUTPATIENT)
Dept: PHYSICAL THERAPY | Facility: CLINIC | Age: 73
End: 2023-06-19
Payer: MEDICARE

## 2023-06-19 DIAGNOSIS — M48.062 SPINAL STENOSIS OF LUMBAR REGION WITH NEUROGENIC CLAUDICATION: ICD-10-CM

## 2023-06-19 DIAGNOSIS — M54.42 CHRONIC BILATERAL LOW BACK PAIN WITH BILATERAL SCIATICA: Primary | ICD-10-CM

## 2023-06-19 DIAGNOSIS — G89.29 CHRONIC BILATERAL LOW BACK PAIN WITH BILATERAL SCIATICA: Primary | ICD-10-CM

## 2023-06-19 DIAGNOSIS — M54.41 CHRONIC BILATERAL LOW BACK PAIN WITH BILATERAL SCIATICA: Primary | ICD-10-CM

## 2023-06-19 PROCEDURE — 97112 NEUROMUSCULAR REEDUCATION: CPT | Performed by: PHYSICAL THERAPIST

## 2023-06-19 PROCEDURE — 97110 THERAPEUTIC EXERCISES: CPT | Performed by: PHYSICAL THERAPIST

## 2023-06-19 NOTE — PROGRESS NOTES
"Daily Note     Today's date: 2023  Patient name: Annie Payne  : [id number]  MRN: [id number]  Referring provider: Kaleigh Suarez  Dx:   Encounter Diagnosis     ICD-10-CM    1  Chronic bilateral low back pain with bilateral sciatica  M54 42     M54 41     G89 29       2  Spinal stenosis of lumbar region with neurogenic claudication  M48 062                      Subjective: Pt reports increased activity intolerance with walking an standing for long periods  Pt reports that she is now able to walk 1/2 mile prior to onset of pain  Objective: See treatment diary below      Assessment: Tolerated treatment well  Patient demonstrated fatigue post treatment, exhibited good technique with therapeutic exercises and would benefit from continued PT  Gradual progression of exercises outlined in POC  Required cueing of abdominal bracing throughout interventions likely indicating weakness  Pt also noted pain in R low back during anti-rotation intervention also likely indicating weakness of core musculature  Pt required breaks during interventions and tolerated treatment well  Pt requires continued skilled PT to address core strength to decrease pain and increase activity tolerance when walking and standing for long periods  Patient treated by Justino [facility] under direct supervision of Mark Meredith DPT         Plan: Continue per plan of care        Precautions: h/o lumbar fusion  POC exp 23  HEP Access Code: [email or web address] - updated 23  Manuals 6/8 6/12 6/15 6/19 6/5                                   Neuro Re-Ed        Supine TrAb bracing with pball Red ball  5\" x30 Red pball  5\" x20 Red ball  5\" x20 Red ball  5\" x20 Red ball  5\" x20   PPT        TrAb bracing with iso hip abd Blue  5\" 3x10 Blue TB  5\" 3x10 Black  5\" 3x10 Blue  5\" 2x10 Blue  5\" 2x10   TrAb bracing with iso hip add yellow  5\"  3x10 Yellow ball  5\" 2x10 yellow  5\"  3x10 yellow  5\"  2x10 yellow  5\"  2x10   TrAb bracing with marches 2# 5\" " "3x10 ea 2# 5\" 3x10 ea 2# 5\" 3 x10 ea 3# 5\" 2x10 ea 2# 5\" 2x10 ea   TrAb bracing with dead bug 2#/2# 5\" 3x10 ea 2# 5\" 3x10 ea np 3# 2#DB 5\" 2x10 ea 2#/2# 5\" 2x10 ea   Bridges 5\" 3x10 5\" 3x10 5\" 3x10 5\" 2x10 5\" 2x10           Seated TrAb bracing with medicine ball lifts - diagonal    Resume NV    Seated alt knee ext w/TrAb bracing Seated on dynadisc    2x10 ea Seated on dynadisc    2x10 ea Seated on dynadisc    2x10 ea Seated on dynadisc EOT    x20 ea Seated on dynadisc    2# x10 ea           Standing TrAb bracing with TB pull downs and TB Rows BTB  5\" x20 ea BTB  5\" x20 ea BlueTB  5\" x20 ea Blue TB  5\" x20 ea BTB  5\" x20 ea   Standing TrAb bracing with TB anti-trunk rotation GTB 5\" x10 ea dir GTB 5\" x10 ea dir GTB 5\" x10 ea dir GTB 5\" x20 ea dir GTB 5\" x10 ea dir   Standing TrAb bracing with arm raises (shoulder flexion and abduction)                        Ther Ex        NuStep for strength and endurance L4 x10 min L5 x10 mins L5 x10 min L5 x10 min L4 x10 min   SKTC stretch                                                        Ther Activity        Fwd step ups        Lat step ups        Mini squats                        Gait Training                        Modalities                             "

## 2023-06-22 ENCOUNTER — OFFICE VISIT (OUTPATIENT)
Dept: PHYSICAL THERAPY | Facility: CLINIC | Age: 73
End: 2023-06-22
Payer: MEDICARE

## 2023-06-22 DIAGNOSIS — G89.29 CHRONIC BILATERAL LOW BACK PAIN WITH BILATERAL SCIATICA: Primary | ICD-10-CM

## 2023-06-22 DIAGNOSIS — M54.42 CHRONIC BILATERAL LOW BACK PAIN WITH BILATERAL SCIATICA: Primary | ICD-10-CM

## 2023-06-22 DIAGNOSIS — M48.062 SPINAL STENOSIS OF LUMBAR REGION WITH NEUROGENIC CLAUDICATION: ICD-10-CM

## 2023-06-22 DIAGNOSIS — M54.41 CHRONIC BILATERAL LOW BACK PAIN WITH BILATERAL SCIATICA: Primary | ICD-10-CM

## 2023-06-22 PROCEDURE — 97110 THERAPEUTIC EXERCISES: CPT | Performed by: PHYSICAL THERAPIST

## 2023-06-22 PROCEDURE — 97112 NEUROMUSCULAR REEDUCATION: CPT | Performed by: PHYSICAL THERAPIST

## 2023-06-22 NOTE — PROGRESS NOTES
"PT Re-Evaluation     Today's date: 2023  Patient name: Neema Anthony  :   MRN: [id number]  Referring provider: Watson Harley  Dx:   Encounter Diagnosis     ICD-10-CM    1  Chronic bilateral low back pain with bilateral sciatica  M54 42     M54 41     G89 29       2  Spinal stenosis of lumbar region with neurogenic claudication  M48 062                      Assessment  Assessment details: Neema Anthony has been compliant with PT services  She has attended a total of 10 visits of OPPT  She has demonstrated decreased pain, increased strength, increased range of motion, and increased activity tolerance since starting physical therapy services  She reports an overall improvement of 30% thus far  She is able to walk further before onset of pain (0 5 miles now vs 0 25 miles at initial evaluation) and can tolerate household chores longer before needing to rest secondary to fatigue or pain  She continues to present with pain, decreased strength, decreased range of motion, and decreased activity tolerance and would benefit from additional skilled physical therapy interventions to address impairments and maximize function        Impairments: abnormal muscle firing, abnormal or restricted ROM, activity intolerance, impaired physical strength, lacks appropriate home exercise program, pain with function and poor posture   Functional limitations: walking limited to 1 mile (onset of pain by 0 25 miles), prolonged sitting limited due to pain (unable to enjoy recreational activity such as sitting through concert), household chores limited by pain (breaks up activities over time)Understanding of Dx/Px/POC: good   Prognosis: good    Goals  STGs  1) In 4 weeks patient will report 3 points reduced pain \"at worst\" - not met  2) In 4 weeks patient will tolerate standing 15 mins or more without increased pain - MET  3) in 4 weeks patient will tolerate walking 1/2 mile or more without increased pain - MET (1/2 " "mile)    LTGs  1) In 6-12 weeks patient will report able to sit for 1 hour or more without increased pain  2) In 6-12 weeks patient will report little difficulty with light to medium household chores  3) in 6-12 weeks patient will report little difficulty with walking 1 mile or more  4) In 6-12 weeks patient will demonstrate independence with HEP       Plan  Patient would benefit from: skilled physical therapy  Referral necessary: No  Planned modality interventions: cryotherapy, TENS and thermotherapy: hydrocollator packs  Planned therapy interventions: joint mobilization, manual therapy, massage, Adams taping, abdominal trunk stabilization, balance, balance/weight bearing training, neuromuscular re-education, patient education, postural training, self care, strengthening, stretching, therapeutic activities, therapeutic exercise, flexibility, functional ROM exercises, gait training and home exercise program  Frequency: 2x week  Duration in weeks: 8  Plan of Care beginning date: 6/22/2023  Plan of Care expiration date: 8/14/2023  Treatment plan discussed with: patient        Subjective Evaluation    History of Present Illness  Mechanism of injury: -patient c/o chronic LBP  -pain is across lower back and down b/l LEs to ankles  -denies N/T  -describes the pain as an \"ache\" and \"burn\"   -h/o lumbar fusion  -most recent MRI was in Oct 2022 with diagnosis of spinal stenosis and foraminal narrowing  -BIANCA in March only helped for one week  -PM considering a second injection if patient agreeable  -continues to take Tylenol arthritis and gabapentin daily gives her some relief, \"takes the edge off\"   -she has had pain for a long time but it is now really starting to affect her life  -when she is in pain while standing and cooking she gets relief with bending over and resting on her kitchen counter  -can only walk for about 1 mile, pain starts around 1/4 mile of walking  -is able to walk independently, uses walking stick " only if she is walking through the woods to help with her balance  -cannot sit for prolonged periods, limits how long she can sit in car or sit through a concert    Re-evaluation 23:  Patient reports she is starting to feel a little better  She is enjoying things in life a little more  She is able to walk further without the pain starting right away  She can get through more of her household chores before needing to rest  She can sleep through the night once she falls asleep  She still has pain with prolonged sitting  Pain  Current pain ratin  At best pain rating: 3  At worst pain ratin  Quality: dull ache and burning    Social Support  Stairs in house: yes (stairs to basement)   Lives in: [facility]  Lives with: spouse    Employment status: not working  Exercise history: walks 6-7 days/week      Diagnostic Tests  MRI studies: abnormal  Treatments  Previous treatment: injection treatment  Patient Goals  Patient goals for therapy: decreased pain  Patient goal: learn HEP        Objective     Concurrent Complaints  Negative for night pain, disturbed sleep, bladder dysfunction, bowel dysfunction and saddle (S4) numbness    Neurological Testing     Sensation     Lumbar   Left   Intact: light touch    Right   Intact: light touch    Reflexes   Left   Patellar (L4): normal (2+)  Achilles (S1): normal (2+)  Babinski sign: negative  Clonus sign: negative    Right   Patellar (L4): absent (0)  Achilles (S1): normal (2+)  Babinski sign: negative  Clonus sign: negative    Active Range of Motion     Lumbar   Flexion: Active lumbar flexion: fingertips 4 cm from floor  WFL  Extension:  with pain Restriction level: minimal  Left lateral flexion: Active left lumbar lateral flexion: fingertips 42 cm from floor  Right lateral flexion: Active right lumbar lateral flexion: fingertips 44 cm from floor     Left rotation:  Restriction level: minimal  Right rotation:  Restriction level: minimal  Mechanical "Assessment    Cervical      Thoracic      Lumbar    Standing flexion: repeated movements   Pain location:no change  Standing extension: repeated movements  Pain location: no change    Strength/Myotome Testing     Left Hip   Planes of Motion   Flexion: 4+  Extension: 4  Abduction: 4+  Adduction: 4+  External rotation: 4+    Right Hip   Planes of Motion   Flexion: 4+  Extension: 4  Abduction: 4+  Adduction: 4+  External rotation: 4+    Left Knee   Flexion: 5  Extension: 5    Right Knee   Flexion: 5  Extension: 5    Left Ankle/Foot   Dorsiflexion: 5  Plantar flexion: 5  Great toe extension: 5    Right Ankle/Foot   Dorsiflexion: 5  Plantar flexion: 5  Great toe extension: 5    Tests     Lumbar     Left   Negative crossed SLR, passive SLR and slump test      Right   Negative crossed SLR, passive SLR and slump test      Left Pelvic Girdle/Sacrum   Negative: active SLR test      Right Pelvic Girdle/Sacrum   Negative: active SLR test      Left Hip   Negative DEVON and long sit  Right Hip   Negative DEVON and long sit              Precautions: h/o lumbar fusion  POC exp 8/14/23  Manuals 6/8 6/12 6/15 6/19 6/22                                   Neuro Re-Ed        Supine TrAb bracing with pball Red ball  5\" x30 Red pball  5\" x20 Red ball  5\" x20 Red ball  5\" x20 Red ball  5\" x20   PPT        TrAb bracing with iso hip abd Blue  5\" 3x10 Blue TB  5\" 3x10 Black  5\" 3x10 Blue  5\" 2x10 Black  5\" 3x10   TrAb bracing with iso hip add yellow  5\"  3x10 Yellow ball  5\" 2x10 yellow  5\"  3x10 yellow  5\"  2x10 yellow  5\"  3x10   TrAb bracing with marches 2# 5\" 3x10 ea 2# 5\" 3x10 ea 2# 5\" 3 x10 ea 3# 5\" 2x10 ea 3# 5\" 2x10 ea   TrAb bracing with dead bug 2#/2# 5\" 3x10 ea 2# 5\" 3x10 ea np 3# 2#DB 5\" 2x10 ea 3#/3# 5\" 2x10 ea   Bridges 5\" 3x10 5\" 3x10 5\" 3x10 5\" 2x10 5\" 3x10           Seated TrAb bracing with medicine ball lifts - diagonal    Resume NV    Seated alt knee ext w/TrAb bracing Seated on dynadisc    2x10 ea Seated on " "dynadisc    2x10 ea Seated on dynadisc    2x10 ea Seated on dynadisc EOT    x20 ea Seated on dynadisc    X20 ea           Standing TrAb bracing with TB pull downs and TB Rows BTB  5\" x20 ea BTB  5\" x20 ea BlueTB  5\" x20 ea Blue TB  5\" x20 ea Blue TB pull downs    Black TB rows  5\" x20 ea   Standing TrAb bracing with TB anti-trunk rotation GTB 5\" x10 ea dir GTB 5\" x10 ea dir GTB 5\" x10 ea dir GTB 5\" x20 ea dir Blue TB 5\" x20 ea dir   Standing TrAb bracing with arm raises (shoulder flexion and abduction)                        Ther Ex        NuStep for strength and endurance L4 x10 min L5 x10 mins L5 x10 min L5 x10 min L5 x10 min   SKTC stretch                                                        Ther Activity        Fwd step ups        Lat step ups        Mini squats                        Gait Training                        Modalities                             "

## 2023-06-26 ENCOUNTER — APPOINTMENT (OUTPATIENT)
Dept: PHYSICAL THERAPY | Facility: CLINIC | Age: 73
End: 2023-06-26
Payer: MEDICARE

## 2023-06-29 ENCOUNTER — APPOINTMENT (OUTPATIENT)
Dept: PHYSICAL THERAPY | Facility: CLINIC | Age: 73
End: 2023-06-29
Payer: MEDICARE

## 2023-06-30 ENCOUNTER — OFFICE VISIT (OUTPATIENT)
Dept: PHYSICAL THERAPY | Facility: CLINIC | Age: 73
End: 2023-06-30
Payer: MEDICARE

## 2023-06-30 DIAGNOSIS — M54.41 CHRONIC BILATERAL LOW BACK PAIN WITH BILATERAL SCIATICA: Primary | ICD-10-CM

## 2023-06-30 DIAGNOSIS — G89.29 CHRONIC BILATERAL LOW BACK PAIN WITH BILATERAL SCIATICA: Primary | ICD-10-CM

## 2023-06-30 DIAGNOSIS — M54.42 CHRONIC BILATERAL LOW BACK PAIN WITH BILATERAL SCIATICA: Primary | ICD-10-CM

## 2023-06-30 DIAGNOSIS — M48.062 SPINAL STENOSIS OF LUMBAR REGION WITH NEUROGENIC CLAUDICATION: ICD-10-CM

## 2023-06-30 PROCEDURE — 97112 NEUROMUSCULAR REEDUCATION: CPT

## 2023-06-30 PROCEDURE — 97110 THERAPEUTIC EXERCISES: CPT

## 2023-06-30 NOTE — PROGRESS NOTES
"Daily Note     Today's date: 2023  Patient name: Hunter Aguirre  : [id number]  MRN: [id number]  Referring provider: Roque Schumacher  Dx:   Encounter Diagnosis     ICD-10-CM    1  Chronic bilateral low back pain with bilateral sciatica  M54 42     M54 41     G89 29       2  Spinal stenosis of lumbar region with neurogenic claudication  M48 062                      Subjective: Pt reported that she feels she has made improvements since the start of PT  She does continue to have moments of LBP and thigh pain  Objective: See treatment diary below      Assessment: Tolerated treatment well  Patient demonstrated fatigue post treatment, exhibited good technique with therapeutic exercises and would benefit from continued PT      Plan: Continue per plan of care  Progress treatment as tolerated         Precautions: h/o lumbar fusion  POC exp 23  Manuals 6/30 6/12 6/15 6/19 6/22                                   Neuro Re-Ed        Supine TrAb bracing with pball Red ball  5\" x30 Red pball  5\" x20 Red ball  5\" x20 Red ball  5\" x20 Red ball  5\" x20   PPT        TrAb bracing with iso hip abd [place]  5\" 3x10 Blue TB  5\" 3x10 Black  5\" 3x10 Blue  5\" 2x10 Black  5\" 3x10   TrAb bracing with iso hip add yellow  5\"  3x10 Yellow ball  5\" 2x10 yellow  5\"  3x10 yellow  5\"  2x10 yellow  5\"  3x10   TrAb bracing with marches 2# 5\" 3x10 ea 2# 5\" 3x10 ea 2# 5\" 3 x10 ea 3# 5\" 2x10 ea 3# 5\" 2x10 ea   TrAb bracing with dead bug 2#/2# 5\" 3x10 ea 2# 5\" 3x10 ea np 3# 2#DB 5\" 2x10 ea 3#/3# 5\" 2x10 ea   Bridges 5\" 3x10 5\" 3x10 5\" 3x10 5\" 2x10 5\" 3x10           Seated TrAb bracing with medicine ball lifts - diagonal    Resume NV    Seated alt knee ext w/TrAb bracing Seated on dynadisc    2x10 ea Seated on dynadisc    2x10 ea Seated on dynadisc    2x10 ea Seated on dynadisc EOT    x20 ea Seated on dynadisc    X20 ea           Standing TrAb bracing with TB pull downs and TB Rows Seated on pball Black TB  5\" x20 ea BTB  5\" x20 ea BlueTB  5\" " "x20 ea Blue TB  5\" x20 ea Blue TB pull downs    Black TB rows  5\" x20 ea   Standing TrAb bracing with TB anti-trunk rotation Blue TB 5\" x20 ea dir GTB 5\" x10 ea dir GTB 5\" x10 ea dir GTB 5\" x20 ea dir Blue TB 5\" x20 ea dir   Standing TrAb bracing with arm raises (shoulder flexion and abduction)                        Ther Ex        NuStep for strength and endurance L6 x10 min L5 x10 mins L5 x10 min L5 x10 min L5 x10 min   SKTC stretch                                                        Ther Activity        Fwd step ups        Lat step ups        Mini squats                        Gait Training                        Modalities                             "

## 2023-07-03 ENCOUNTER — APPOINTMENT (OUTPATIENT)
Dept: PHYSICAL THERAPY | Facility: CLINIC | Age: 73
End: 2023-07-03
Payer: MEDICARE

## 2023-07-05 ENCOUNTER — OFFICE VISIT (OUTPATIENT)
Dept: PHYSICAL THERAPY | Facility: CLINIC | Age: 73
End: 2023-07-05
Payer: MEDICARE

## 2023-07-05 DIAGNOSIS — M54.41 CHRONIC BILATERAL LOW BACK PAIN WITH BILATERAL SCIATICA: Primary | ICD-10-CM

## 2023-07-05 DIAGNOSIS — M48.062 SPINAL STENOSIS OF LUMBAR REGION WITH NEUROGENIC CLAUDICATION: ICD-10-CM

## 2023-07-05 DIAGNOSIS — M54.42 CHRONIC BILATERAL LOW BACK PAIN WITH BILATERAL SCIATICA: Primary | ICD-10-CM

## 2023-07-05 DIAGNOSIS — G89.29 CHRONIC BILATERAL LOW BACK PAIN WITH BILATERAL SCIATICA: Primary | ICD-10-CM

## 2023-07-05 PROCEDURE — 97110 THERAPEUTIC EXERCISES: CPT

## 2023-07-05 PROCEDURE — 97112 NEUROMUSCULAR REEDUCATION: CPT

## 2023-07-05 NOTE — PROGRESS NOTES
Daily Note     Today's date: 2023  Patient name: Maisha Dobson  : [id number]  MRN: [id number]  Referring provider: Roberto Wong  Dx:   Encounter Diagnosis     ICD-10-CM    1. Chronic bilateral low back pain with bilateral sciatica  M54.42     M54.41     G89.29       2. Spinal stenosis of lumbar region with neurogenic claudication  M48.062                      Subjective: Pt reports she is doing well. Compliant with HEP, making conscious effort to tighten core and maintain good posture. Pt states another positive note that since doing exercises her shoulders don't hurt anymore. Objective: See treatment diary below      Assessment: Tolerated treatment well. Current exercise program is challenging for patient. Patient demonstrated fatigue post treatment, exhibited good technique with therapeutic exercises and would benefit from continued PT      Plan: Continue per plan of care.       Precautions: h/o lumbar fusion  POC exp 23  Manuals 6/30 7/5 6/15 6/19 6/22                                   Neuro Re-Ed        Supine TrAb bracing with pball Red ball  5" x30 Red pball  5" x30 Red ball  5" x20 Red ball  5" x20 Red ball  5" x20   PPT        TrAb bracing with iso hip abd Grey  5" 3x10 GreyTB  5" 3x10 Black  5" 3x10 Blue  5" 2x10 Black  5" 3x10   TrAb bracing with iso hip add yellow  5"  3x10 Yellow ball  5" 2x10 yellow  5"  3x10 yellow  5"  2x10 yellow  5"  3x10   TrAb bracing with marches 3# 5" 3x10 ea 3# 5" 3x10 ea 2# 5" 3 x10 ea 3# 5" 2x10 ea 3# 5" 2x10 ea   TrAb bracing with dead bug 2#/2# 5" 3x10 ea 3/3# 5" 3x10 ea np 3# 2#DB 5" 2x10 ea 3#/3# 5" 2x10 ea   Bridges 5" 3x10 5" 3x10 5" 3x10 5" 2x10 5" 3x10           Seated TrAb bracing with medicine ball lifts - diagonal    Resume NV    Seated alt knee ext w/TrAb bracing Seated on dynadisc    2x10 ea Seated on dynadisc    3x10 ea Seated on dynadisc    2x10 ea Seated on dynadisc EOT    x20 ea Seated on dynadisc    X20 ea           Standing TrAb bracing with TB pull downs and TB Rows Seated on pball Black TB  5" x20 ea seated on pball  Black  TB  5" x20 ea BlueTB  5" x20 ea Blue TB  5" x20 ea Blue TB pull downs    Black TB rows  5" x20 ea   Standing TrAb bracing with TB anti-trunk rotation Blue TB 5" x20 ea dir Blue  TB 5" x10 ea dir GTB 5" x10 ea dir GTB 5" x20 ea dir Blue TB 5" x20 ea dir   Standing TrAb bracing with arm raises (shoulder flexion and abduction)                        Ther Ex        NuStep for strength and endurance L6 x10 min L6 x10 mins L5 x10 min L5 x10 min L5 x10 min   SKTC stretch                                                        Ther Activity        Fwd step ups        Lat step ups        Mini squats                        Gait Training                        Modalities

## 2023-07-06 ENCOUNTER — APPOINTMENT (OUTPATIENT)
Dept: PHYSICAL THERAPY | Facility: CLINIC | Age: 73
End: 2023-07-06
Payer: MEDICARE

## 2023-07-07 ENCOUNTER — OFFICE VISIT (OUTPATIENT)
Dept: PHYSICAL THERAPY | Facility: CLINIC | Age: 73
End: 2023-07-07
Payer: MEDICARE

## 2023-07-07 DIAGNOSIS — M54.42 CHRONIC BILATERAL LOW BACK PAIN WITH BILATERAL SCIATICA: Primary | ICD-10-CM

## 2023-07-07 DIAGNOSIS — M54.41 CHRONIC BILATERAL LOW BACK PAIN WITH BILATERAL SCIATICA: Primary | ICD-10-CM

## 2023-07-07 DIAGNOSIS — G89.29 CHRONIC BILATERAL LOW BACK PAIN WITH BILATERAL SCIATICA: Primary | ICD-10-CM

## 2023-07-07 DIAGNOSIS — M48.062 SPINAL STENOSIS OF LUMBAR REGION WITH NEUROGENIC CLAUDICATION: ICD-10-CM

## 2023-07-07 PROCEDURE — 97112 NEUROMUSCULAR REEDUCATION: CPT

## 2023-07-07 PROCEDURE — 97110 THERAPEUTIC EXERCISES: CPT

## 2023-07-07 NOTE — PROGRESS NOTES
Daily Note     Today's date: 2023  Patient name: Long Hopson  :   MRN: [id number]  Referring provider: Jose Aguayo  Dx:   Encounter Diagnosis     ICD-10-CM    1. Chronic bilateral low back pain with bilateral sciatica  M54.42     M54.41     G89.29       2. Spinal stenosis of lumbar region with neurogenic claudication  M48.062                      Subjective: Pt reported that her quads were fatigue prior to PT. Objective: See treatment diary below      Assessment: Held the NuStep secondary to pt's time restraints. Resume NV. Tolerated treatment well. Patient demonstrated fatigue post treatment, exhibited good technique with therapeutic exercises and would benefit from continued PT      Plan: Continue per plan of care. Progress treatment as tolerated.        Precautions: h/o lumbar fusion  POC exp 23  Manuals                                    Neuro Re-Ed        Supine TrAb bracing with pball Red ball  5" x30 Red pball  5" x30 Red ball  5" x30 Red ball  5" x20 Red ball  5" x20   PPT        TrAb bracing with iso hip abd Grey  5" 3x10 Grey TB  5" 3x10 Grey  5" 3x10 Blue  5" 2x10 Black  5" 3x10   TrAb bracing with iso hip add yellow  5"  3x10 Yellow ball  5" 2x10 Yellow ball 5"  3x10 yellow  5"  2x10 yellow  5"  3x10   TrAb bracing with marches 3# 5" 3x10 ea 3# 5" 3x10 ea 3# 5"   2x10 ea 3# 5" 2x10 ea 3# 5" 2x10 ea   TrAb bracing with dead bug 2#/2# 5" 3x10 ea 3/3# 5" 3x10 ea 3/3# 5" 2x10 ea 3# 2#DB 5" 2x10 ea 3#/3# 5" 2x10 ea   Bridges 5" 3x10 5" 3x10 5" 3x10 5" 2x10 5" 3x10           Seated TrAb bracing with medicine ball lifts - diagonal    Resume NV    Seated alt knee ext w/TrAb bracing Seated on dynadisc    2x10 ea Seated on dynadisc    3x10 ea Seated on dynadisc    2x10 ea Seated on dynadisc EOT    x20 ea Seated on dynadisc    X20 ea           Standing TrAb bracing with TB pull downs and TB Rows Seated on pball Black TB  5" x20 ea Seated on pball  Black  TB  5" x20 ea Seated on pball  Black  TB  5" x20 ea Blue TB  5" x20 ea Blue TB pull downs    Black TB rows  5" x20 ea   Standing TrAb bracing with TB anti-trunk rotation Blue TB 5" x20 ea dir Blue  TB 5" x10 ea dir Blue TB 5" x20 ea dir GTB 5" x20 ea dir Blue TB 5" x20 ea dir   Standing TrAb bracing with arm raises (shoulder flexion and abduction)                        Ther Ex        NuStep for strength and endurance L6 x10 min L6 x10 mins Resume NV L5 x10 min L5 x10 min   SKTC stretch                                                        Ther Activity        Fwd step ups        Lat step ups        Mini squats                        Gait Training                        Modalities

## 2023-07-10 ENCOUNTER — OFFICE VISIT (OUTPATIENT)
Dept: PHYSICAL THERAPY | Facility: CLINIC | Age: 73
End: 2023-07-10
Payer: MEDICARE

## 2023-07-10 DIAGNOSIS — G89.29 CHRONIC BILATERAL LOW BACK PAIN WITH BILATERAL SCIATICA: Primary | ICD-10-CM

## 2023-07-10 DIAGNOSIS — M54.42 CHRONIC BILATERAL LOW BACK PAIN WITH BILATERAL SCIATICA: Primary | ICD-10-CM

## 2023-07-10 DIAGNOSIS — M54.41 CHRONIC BILATERAL LOW BACK PAIN WITH BILATERAL SCIATICA: Primary | ICD-10-CM

## 2023-07-10 DIAGNOSIS — M48.062 SPINAL STENOSIS OF LUMBAR REGION WITH NEUROGENIC CLAUDICATION: ICD-10-CM

## 2023-07-10 PROCEDURE — 97110 THERAPEUTIC EXERCISES: CPT

## 2023-07-10 PROCEDURE — 97112 NEUROMUSCULAR REEDUCATION: CPT

## 2023-07-10 NOTE — PROGRESS NOTES
Daily Note     Today's date: 7/10/2023  Patient name: Maria C Jimenez  :   MRN: [id number]  Referring provider: Ada Casper  Dx:   Encounter Diagnosis     ICD-10-CM    1. Chronic bilateral low back pain with bilateral sciatica  M54.42     M54.41     G89.29       2. Spinal stenosis of lumbar region with neurogenic claudication  M48.062                      Subjective: Pt reported that she was walking/standing for 7 hrs on Saturday while at multiple concerns. Her legs were very fatigue by the end of the day. Objective: See treatment diary below      Assessment: Tolerated treatment well. Patient demonstrated fatigue post treatment, exhibited good technique with therapeutic exercises and would benefit from continued PT      Plan: Continue per plan of care. Progress treatment as tolerated.        Precautions: h/o lumbar fusion  POC exp 23  Manuals 6/30 7/5 7/7 7/10 6/22                                   Neuro Re-Ed        Supine TrAb bracing with pball Red ball  5" x30 Red pball  5" x30 Red ball  5" x30 Red ball  5" x30 Red ball  5" x20   PPT        TrAb bracing with iso hip abd Grey  5" 3x10 Grey TB  5" 3x10 Grey  5" 3x10 Grey  5" 3x10 Black  5" 3x10   TrAb bracing with iso hip add yellow  5"  3x10 Yellow ball  5" 2x10 Yellow ball 5"  3x10 yellow  5"  3x10 yellow  5"  3x10   TrAb bracing with marches 3# 5" 3x10 ea 3# 5" 3x10 ea 3# 5"   2x10 ea 3# 5" 2x10 ea 3# 5" 2x10 ea   TrAb bracing with dead bug 2#/2# 5" 3x10 ea 3/3# 5" 3x10 ea 3/3# 5" 2x10 ea 3#/3# 5" 2x10 ea 3#/3# 5" 2x10 ea   Bridges 5" 3x10 5" 3x10 5" 3x10 5" 3x10 5" 3x10           Seated TrAb bracing with medicine ball lifts - diagonal        Seated alt knee ext w/TrAb bracing Seated on dynadisc    2x10 ea Seated on dynadisc    3x10 ea Seated on dynadisc    2x10 ea Seated on dynadisc EOT    x20 ea Seated on dynadisc    X20 ea           Standing TrAb bracing with TB pull downs and TB Rows Seated on pball Black TB  5" x20 ea Seated on pball  Black  TB  5" x20 ea Seated on pball  Black  TB  5" x20 ea Seated on pball  Black  TB  5" x20 ea Blue TB pull downs    Black TB rows  5" x20 ea   Standing TrAb bracing with TB anti-trunk rotation Blue TB 5" x20 ea dir Blue  TB 5" x10 ea dir Blue TB 5" x20 ea dir BTB 5" x20 ea dir Blue TB 5" x20 ea dir   Standing TrAb bracing with arm raises (shoulder flexion and abduction)                        Ther Ex        NuStep for strength and endurance L6 x10 min L6 x10 mins Resume NV L5 x10 min L5 x10 min   SKTC stretch                                                        Ther Activity        Fwd step ups        Lat step ups        Mini squats                        Gait Training                        Modalities

## 2023-07-13 ENCOUNTER — OFFICE VISIT (OUTPATIENT)
Dept: PHYSICAL THERAPY | Facility: CLINIC | Age: 73
End: 2023-07-13
Payer: MEDICARE

## 2023-07-13 DIAGNOSIS — M54.42 CHRONIC BILATERAL LOW BACK PAIN WITH BILATERAL SCIATICA: Primary | ICD-10-CM

## 2023-07-13 DIAGNOSIS — M54.41 CHRONIC BILATERAL LOW BACK PAIN WITH BILATERAL SCIATICA: Primary | ICD-10-CM

## 2023-07-13 DIAGNOSIS — M48.062 SPINAL STENOSIS OF LUMBAR REGION WITH NEUROGENIC CLAUDICATION: ICD-10-CM

## 2023-07-13 DIAGNOSIS — G89.29 CHRONIC BILATERAL LOW BACK PAIN WITH BILATERAL SCIATICA: Primary | ICD-10-CM

## 2023-07-13 PROCEDURE — 97112 NEUROMUSCULAR REEDUCATION: CPT | Performed by: PHYSICAL THERAPIST

## 2023-07-13 PROCEDURE — 97110 THERAPEUTIC EXERCISES: CPT | Performed by: PHYSICAL THERAPIST

## 2023-07-13 NOTE — PROGRESS NOTES
Daily Note     Today's date: 2023  Patient name: Belen Atkinson  :   MRN: [id number]  Referring provider: Ihsan Salgado  Dx:   Encounter Diagnosis     ICD-10-CM    1. Chronic bilateral low back pain with bilateral sciatica  M54.42     M54.41     G89.29       2. Spinal stenosis of lumbar region with neurogenic claudication  M48.062                      Subjective: Patient reports yesterday wasn't a great day but overall she is feeling better since starting PT. She feels she will be able to continue with a HEP after next week. Objective: See treatment diary below      Assessment: Tolerated treatment well. Patient demonstrated fatigue post treatment, exhibited good technique with therapeutic exercises and would benefit from continued PT      Plan: Continue per plan of care. Progress treatment as tolerated. Planning DC for end of next week. Will need TB for HEP.       Precautions: h/o lumbar fusion  POC exp 23  Manuals  7/5 7/7 7/10 7/13                                   Neuro Re-Ed        Supine TrAb bracing with pball Red ball  5" x30 Red pball  5" x30 Red ball  5" x30 Red ball  5" x30 Red ball  5" x30   PPT        TrAb bracing with iso hip abd Grey  5" 3x10 Grey TB  5" 3x10 Grey  5" 3x10 Grey  5" 3x10 Grey   5" 3x10   TrAb bracing with iso hip add yellow  5"  3x10 Yellow ball  5" 2x10 Yellow ball 5"  3x10 yellow  5"  3x10 Yellow  5" 3x10   TrAb bracing with marches 3# 5" 3x10 ea 3# 5" 3x10 ea 3# 5"   2x10 ea 3# 5" 2x10 ea 2.5# 3x10 ea   TrAb bracing with dead bug 2#/2# 5" 3x10 ea 3/3# 5" 3x10 ea 3/3# 5" 2x10 ea 3#/3# 5" 2x10 ea 2.5#/3#  3x10 ea   Bridges 5" 3x10 5" 3x10 5" 3x10 5" 3x10 5" 3x10           Seated TrAb bracing with medicine ball lifts - diagonal        Seated alt knee ext w/TrAb bracing Seated on dynadisc    2x10 ea Seated on dynadisc    3x10 ea Seated on dynadisc    2x10 ea Seated on dynadisc EOT    x20 ea Seated on dynadisc EOT    x20 ea           Standing TrAb bracing with TB pull downs and TB Rows Seated on pball Black TB  5" x20 ea Seated on pball  Black  TB  5" x20 ea Seated on pball  Black  TB  5" x20 ea Seated on pball  Black  TB  5" x20 ea Seated on pball  Black TB  5" x20 ea   Standing TrAb bracing with TB anti-trunk rotation Blue TB 5" x20 ea dir Blue  TB 5" x10 ea dir Blue TB 5" x20 ea dir BTB 5" x20 ea dir Green TB  5" x20 ea dir   Standing TrAb bracing with arm raises (shoulder flexion and abduction)                        Ther Ex        NuStep for strength and endurance L6 x10 min L6 x10 mins Resume NV L5 x10 min L6 x10 mins   SKTC stretch                                                        Ther Activity        Fwd step ups        Lat step ups        Mini squats                        Gait Training                        Modalities

## 2023-07-17 ENCOUNTER — OFFICE VISIT (OUTPATIENT)
Dept: PHYSICAL THERAPY | Facility: CLINIC | Age: 73
End: 2023-07-17
Payer: MEDICARE

## 2023-07-17 DIAGNOSIS — M54.42 CHRONIC BILATERAL LOW BACK PAIN WITH BILATERAL SCIATICA: Primary | ICD-10-CM

## 2023-07-17 DIAGNOSIS — M48.062 SPINAL STENOSIS OF LUMBAR REGION WITH NEUROGENIC CLAUDICATION: ICD-10-CM

## 2023-07-17 DIAGNOSIS — G89.29 CHRONIC BILATERAL LOW BACK PAIN WITH BILATERAL SCIATICA: Primary | ICD-10-CM

## 2023-07-17 DIAGNOSIS — M54.41 CHRONIC BILATERAL LOW BACK PAIN WITH BILATERAL SCIATICA: Primary | ICD-10-CM

## 2023-07-17 PROCEDURE — 97110 THERAPEUTIC EXERCISES: CPT | Performed by: PHYSICAL THERAPIST

## 2023-07-17 PROCEDURE — 97112 NEUROMUSCULAR REEDUCATION: CPT | Performed by: PHYSICAL THERAPIST

## 2023-07-17 NOTE — PROGRESS NOTES
Daily Note     Today's date: 2023  Patient name: Carmen Duncan  : [id number]  MRN: [id number]  Referring provider: Vicki Jackson  Dx:   Encounter Diagnosis     ICD-10-CM    1. Chronic bilateral low back pain with bilateral sciatica  M54.42     M54.41     G89.29       2. Spinal stenosis of lumbar region with neurogenic claudication  M48.062                      Subjective: Patient reports she did some weeding this weekend for about 2 hours and was not nearly as sore as she expected. Objective: See treatment diary below      Assessment: Tolerated treatment well. Patient demonstrates good recall with exercise and demonstrates readiness to DC to HEP. Patient demonstrated fatigue post treatment, exhibited good technique with therapeutic exercises and would benefit from continued PT      Plan: Continue per plan of care. Progress note during next visit. Potential discharge next visit.      Precautions: h/o lumbar fusion  POC exp 23  Manuals 7/17 7/5 7/7 7/10 7/13                                   Neuro Re-Ed        Supine TrAb bracing with pball  Red pball  5" x30 Red ball  5" x30 Red ball  5" x30 Red ball  5" x30   PPT        TrAb bracing with iso hip abd Grey TB  5" 3x10 Grey TB  5" 3x10 Grey  5" 3x10 Grey  5" 3x10 Grey   5" 3x10   TrAb bracing with iso hip add Yellow ball  5" 3x10 Yellow ball  5" 2x10 Yellow ball 5"  3x10 yellow  5"  3x10 Yellow  5" 3x10   TrAb bracing with marches 3# 3x10 ea 3# 5" 3x10 ea 3# 5"   2x10 ea 3# 5" 2x10 ea 2.5# 3x10 ea   TrAb bracing with dead bug 3# 3x10 ea 3/3# 5" 3x10 ea 3/3# 5" 2x10 ea 3#/3# 5" 2x10 ea 2.5#/3#  3x10 ea   Bridges 5" 3x10 5" 3x10 5" 3x10 5" 3x10 5" 3x10           Seated TrAb bracing with medicine ball lifts - diagonal        Seated alt knee ext w/TrAb bracing Seated on dynadsic    x20 ea Seated on dynadisc    3x10 ea Seated on dynadisc    2x10 ea Seated on dynadisc EOT    x20 ea Seated on dynadisc EOT    x20 ea           Standing TrAb bracing with TB pull downs and TB Rows Seated on pball   Black 5" x20 ea Seated on pball  Black  TB  5" x20 ea Seated on pball  Black  TB  5" x20 ea Seated on pball  Black  TB  5" x20 ea Seated on pball  Black TB  5" x20 ea   Standing TrAb bracing with TB anti-trunk rotation Green TB  5" x20 ea dir Blue  TB 5" x10 ea dir Blue TB 5" x20 ea dir BTB 5" x20 ea dir Green TB  5" x20 ea dir   Standing TrAb bracing with arm raises (shoulder flexion and abduction)                        Ther Ex        NuStep for strength and endurance L6 x10 mins L6 x10 mins Resume NV L5 x10 min L6 x10 mins   SKTC stretch                                                        Ther Activity        Fwd step ups        Lat step ups        Mini squats                        Gait Training                        Modalities

## 2023-07-20 ENCOUNTER — EVALUATION (OUTPATIENT)
Dept: PHYSICAL THERAPY | Facility: CLINIC | Age: 73
End: 2023-07-20
Payer: MEDICARE

## 2023-07-20 DIAGNOSIS — M54.41 CHRONIC BILATERAL LOW BACK PAIN WITH BILATERAL SCIATICA: Primary | ICD-10-CM

## 2023-07-20 DIAGNOSIS — G89.29 CHRONIC BILATERAL LOW BACK PAIN WITH BILATERAL SCIATICA: Primary | ICD-10-CM

## 2023-07-20 DIAGNOSIS — M54.42 CHRONIC BILATERAL LOW BACK PAIN WITH BILATERAL SCIATICA: Primary | ICD-10-CM

## 2023-07-20 DIAGNOSIS — M48.062 SPINAL STENOSIS OF LUMBAR REGION WITH NEUROGENIC CLAUDICATION: ICD-10-CM

## 2023-07-20 PROCEDURE — 97112 NEUROMUSCULAR REEDUCATION: CPT | Performed by: PHYSICAL THERAPIST

## 2023-07-20 NOTE — LETTER
2023    [place] [id number]    Patient: Dewey Willis   YOB: 1950   Date of Visit: 2023     Encounter Diagnosis     ICD-10-CM    1. Chronic bilateral low back pain with bilateral sciatica  M54.42     M54.41     G89.29       2. Spinal stenosis of lumbar region with neurogenic claudication  M48.062           Dear Dr. Kaykay Franks: Thank you for your recent referral of Dewey Willis. Please review the attached evaluation summary from Kevin's recent visit. Please verify that you agree with the plan of care by signing the attached order. If you have any questions or concerns, please do not hesitate to call. I sincerely appreciate the opportunity to share in the care of one of your patients and hope to have another opportunity to work with you in the near future. Sincerely,    Nathan Vigil, PT      Referring Provider:      I certify that I have read the below Plan of Care and certify the need for these services furnished under this plan of treatment while under my care. Castro Rios  [address] [facility] [id number]  [address]          PT Re-Evaluation  and PT Discharge    Today's date: 2023  Patient name: Dewey Willis  : [birth year]  MRN: [id number]  Referring provider: Castro Rios  Dx:   Encounter Diagnosis     ICD-10-CM    1. Chronic bilateral low back pain with bilateral sciatica  M54.42     M54.41     G89.29       2. Spinal stenosis of lumbar region with neurogenic claudication  M48.062                      Assessment  Assessment details: Dewey Willis has been compliant with PT services. She has attended a total of 17 visits of OPPT. She has demonstrated decreased pain, increased strength, increased range of motion, and increased activity tolerance since starting physical therapy services.  She reports an overall improvement of 50% thus far. At this time, patient has achieved their maximum functional benefit from skilled physical therapy services and will be discharged to their HEP. Patient is in agreement with the plan of care. As a result, patient is discharged from physical therapy.  Thank you for the referral.   Functional limitations: limited walking, standing, household chores and gardeningUnderstanding of Dx/Px/POC: good   Prognosis: good    Goals  STGs  1) In 4 weeks patient will report 3 points reduced pain "at worst" - MET  2) In 4 weeks patient will tolerate standing 15 mins or more without increased pain - MET  3) in 4 weeks patient will tolerate walking 1/2 mile or more without increased pain - MET (1/2 mile)    LTGs  1) In 6-12 weeks patient will report able to sit for 1 hour or more without increased pain - improved  2) In 6-12 weeks patient will report little difficulty with light to medium household chores - MET  3) in 6-12 weeks patient will report little difficulty with walking 1 mile or more - improved  4) In 6-12 weeks patient will demonstrate independence with HEP.  - MET    Plan  Referral necessary: No  Treatment plan discussed with: patient        Subjective Evaluation    History of Present Illness  Mechanism of injury: -patient c/o chronic LBP  -pain is across lower back and down b/l LEs to ankles  -denies N/T  -describes the pain as an "ache" and "burn"   -h/o lumbar fusion  -most recent MRI was in Oct 2022 with diagnosis of spinal stenosis and foraminal narrowing  -BIANCA in March only helped for one week  -PM considering a second injection if patient agreeable  -continues to take Tylenol arthritis and gabapentin daily gives her some relief, "takes the edge off"   -she has had pain for a long time but it is now really starting to affect her life  -when she is in pain while standing and cooking she gets relief with bending over and resting on her kitchen counter  -can only walk for about 1 mile, pain starts around 1/4 mile of walking  -is able to walk independently, uses walking stick only if she is walking through the woods to help with her balance  -cannot sit for prolonged periods, limits how long she can sit in car or sit through a concert    Re-evaluation 23:  Patient reports she is starting to feel a little better. She is enjoying things in life a little more. She is able to walk further without the pain starting right away. She can get through more of her household chores before needing to rest. She can sleep through the night once she falls asleep. She still has pain with prolonged sitting. Re-evaluation 23:   Patient reports she feels 50% better at times. She feels her stamina is better. She no longer leans on her counter for support. She has been active with gardening lately. She continues to take Tylenol and Gabapentin before bed. Patient Goals  Patient goals for therapy: decreased pain  Patient goal: learn HEP  Pain  Current pain ratin  At best pain ratin  At worst pain ratin  Quality: dull ache and burning    Social Support  Stairs in house: yes (stairs to basement)   Lives in: [facility] house  Lives with: spouse    Employment status: not working  Exercise history: walks 6-7 days/week      Diagnostic Tests  MRI studies: abnormal  Treatments  Previous treatment: injection treatment        Objective     Concurrent Complaints  Negative for night pain, disturbed sleep, bladder dysfunction, bowel dysfunction and saddle (S4) numbness    Neurological Testing     Sensation     Lumbar   Left   Intact: light touch    Right   Intact: light touch    Reflexes   Left   Patellar (L4): normal (2+)  Achilles (S1): normal (2+)  Babinski sign: negative  Clonus sign: negative    Right   Patellar (L4): absent (0)  Achilles (S1): normal (2+)  Babinski sign: negative  Clonus sign: negative    Active Range of Motion     Lumbar   Flexion: Active lumbar flexion: fingertips 4 cm from floor.   WFL  Extension:  with pain Restriction level: minimal  Left lateral flexion: Active left lumbar lateral flexion: fingertips 42 cm from floor. Right lateral flexion: Active right lumbar lateral flexion: fingertips 44 cm from floor. Left rotation:  Restriction level: minimal  Right rotation:  Restriction level: minimal  Mechanical Assessment    Cervical      Thoracic      Lumbar    Standing flexion: repeated movements   Pain location:no change  Standing extension: repeated movements  Pain location: no change    Strength/Myotome Testing     Left Hip   Planes of Motion   Flexion: 4+  Extension: 4  Abduction: 4+  Adduction: 4+  External rotation: 4+    Right Hip   Planes of Motion   Flexion: 4+  Extension: 4  Abduction: 4+  Adduction: 4+  External rotation: 4+    Left Knee   Flexion: 5  Extension: 5    Right Knee   Flexion: 5  Extension: 5    Left Ankle/Foot   Dorsiflexion: 5  Plantar flexion: 5  Great toe extension: 5    Right Ankle/Foot   Dorsiflexion: 5  Plantar flexion: 5  Great toe extension: 5    Tests     Lumbar     Left   Negative crossed SLR, passive SLR and slump test.     Right   Negative crossed SLR, passive SLR and slump test.     Left Pelvic Girdle/Sacrum   Negative: active SLR test.     Right Pelvic Girdle/Sacrum   Negative: active SLR test.     Left Hip   Negative DEVON and long sit. Right Hip   Negative DEVON and long sit.            Precautions: h/o lumbar fusion  POC exp 8/14/23  Manuals 7/17 7/20 7/7 7/10 7/13                                   Neuro Re-Ed        Supine TrAb bracing with pball   Red ball  5" x30 Red ball  5" x30 Red ball  5" x30   PPT        TrAb bracing with iso hip abd Grey TB  5" 3x10 Grey TB  5" 3x10 Grey  5" 3x10 Grey  5" 3x10 Grey   5" 3x10   TrAb bracing with iso hip add Yellow ball  5" 3x10 Yellow ball  5" 2x10 Yellow ball 5"  3x10 yellow  5"  3x10 Yellow  5" 3x10   TrAb bracing with marches 3# 3x10 ea 3# 5" 3x10 ea 3# 5"   2x10 ea 3# 5" 2x10 ea 2.5# 3x10 ea   TrAb bracing with dead bug 3# 3x10 ea 3/3# 5" 3x10 ea 3/3# 5" 2x10 ea 3#/3# 5" 2x10 ea 2.5#/3#  3x10 ea   Bridges 5" 3x10 5" 3x10 5" 3x10 5" 3x10 5" 3x10           Seated alt knee ext w/TrAb bracing Seated on dynadsic    x20 ea Seated on dynadisc    2x10 ea Seated on dynadisc    2x10 ea Seated on dynadisc EOT    x20 ea Seated on dynadisc EOT    x20 ea           Standing TrAb bracing with TB pull downs and TB Rows Seated on pball   Black 5" x20 ea Seated on pball  Black  TB  5" x20 ea Seated on pball  Black  TB  5" x20 ea Seated on pball  Black  TB  5" x20 ea Seated on pball  Black TB  5" x20 ea   Standing TrAb bracing with TB anti-trunk rotation Green TB  5" x20 ea dir Green  TB 5" x10 ea dir Blue TB 5" x20 ea dir BTB 5" x20 ea dir Green TB  5" x20 ea dir   Standing TrAb bracing with arm raises (shoulder flexion and abduction)                        Ther Ex        NuStep for strength and endurance L6 x10 mins L7 x5.5 mins,  L6 x4.5 mins Resume NV L5 x10 min L6 x10 mins   SKTC stretch                                                        Ther Activity        Fwd step ups        Lat step ups        Mini squats                        Gait Training                        Modalities

## 2023-07-20 NOTE — PROGRESS NOTES
PT Re-Evaluation  and PT Discharge    Today's date: 2023  Patient name: Lauren Coles  :   MRN: [id number]  Referring provider: Sweta Whitten  Dx:   Encounter Diagnosis     ICD-10-CM    1. Chronic bilateral low back pain with bilateral sciatica  M54.42     M54.41     G89.29       2. Spinal stenosis of lumbar region with neurogenic claudication  M48.062                      Assessment  Assessment details: Lauren Coles has been compliant with PT services. She has attended a total of 17 visits of OPPT. She has demonstrated decreased pain, increased strength, increased range of motion, and increased activity tolerance since starting physical therapy services. She reports an overall improvement of 50% thus far. At this time, patient has achieved their maximum functional benefit from skilled physical therapy services and will be discharged to their HEP. Patient is in agreement with the plan of care. As a result, patient is discharged from physical therapy.  Thank you for the referral.   Functional limitations: limited walking, standing, household chores and gardeningUnderstanding of Dx/Px/POC: good   Prognosis: good    Goals  STGs  1) In 4 weeks patient will report 3 points reduced pain "at worst" - MET  2) In 4 weeks patient will tolerate standing 15 mins or more without increased pain - MET  3) in 4 weeks patient will tolerate walking 1/2 mile or more without increased pain - MET (1/2 mile)    LTGs  1) In 6-12 weeks patient will report able to sit for 1 hour or more without increased pain - improved  2) In 6-12 weeks patient will report little difficulty with light to medium household chores - MET  3) in 6-12 weeks patient will report little difficulty with walking 1 mile or more - improved  4) In 6-12 weeks patient will demonstrate independence with HEP.  - MET    Plan  Referral necessary: No  Treatment plan discussed with: patient        Subjective Evaluation    History of Present Illness  Mechanism of injury: -patient c/o chronic LBP  -pain is across lower back and down b/l LEs to ankles  -denies N/T  -describes the pain as an "ache" and "burn"   -h/o lumbar fusion  -most recent MRI was in Oct 2022 with diagnosis of spinal stenosis and foraminal narrowing  -BIANCA in March only helped for one week  -PM considering a second injection if patient agreeable  -continues to take Tylenol arthritis and gabapentin daily gives her some relief, "takes the edge off"   -she has had pain for a long time but it is now really starting to affect her life  -when she is in pain while standing and cooking she gets relief with bending over and resting on her kitchen counter  -can only walk for about 1 mile, pain starts around 1/4 mile of walking  -is able to walk independently, uses walking stick only if she is walking through the woods to help with her balance  -cannot sit for prolonged periods, limits how long she can sit in car or sit through a concert    Re-evaluation 23:  Patient reports she is starting to feel a little better. She is enjoying things in life a little more. She is able to walk further without the pain starting right away. She can get through more of her household chores before needing to rest. She can sleep through the night once she falls asleep. She still has pain with prolonged sitting. Re-evaluation 23:   Patient reports she feels 50% better at times. She feels her stamina is better. She no longer leans on her counter for support. She has been active with gardening lately. She continues to take Tylenol and Gabapentin before bed.    Patient Goals  Patient goals for therapy: decreased pain  Patient goal: learn HEP  Pain  Current pain ratin  At best pain ratin  At worst pain ratin  Quality: dull ache and burning    Social Support  Stairs in house: yes (stairs to basement)   [facility]  Lives with: spouse    Employment status: not working  Exercise history: walks 6-7 days/week      Diagnostic Tests  MRI studies: abnormal  Treatments  Previous treatment: injection treatment        Objective     Concurrent Complaints  Negative for night pain, disturbed sleep, bladder dysfunction, bowel dysfunction and saddle (S4) numbness    Neurological Testing     Sensation     Lumbar   Left   Intact: light touch    Right   Intact: light touch    Reflexes   Left   Patellar (L4): normal (2+)  Achilles (S1): normal (2+)  Babinski sign: negative  Clonus sign: negative    Right   Patellar (L4): absent (0)  Achilles (S1): normal (2+)  Babinski sign: negative  Clonus sign: negative    Active Range of Motion     Lumbar   Flexion: Active lumbar flexion: fingertips 4 cm from floor. WFL  Extension:  with pain Restriction level: minimal  Left lateral flexion: Active left lumbar lateral flexion: fingertips 42 cm from floor. Right lateral flexion: Active right lumbar lateral flexion: fingertips 44 cm from floor.    Left rotation:  Restriction level: minimal  Right rotation:  Restriction level: minimal  Mechanical Assessment    Cervical      Thoracic      Lumbar    Standing flexion: repeated movements   Pain location:no change  Standing extension: repeated movements  Pain location: no change    Strength/Myotome Testing     Left Hip   Planes of Motion   Flexion: 4+  Extension: 4  Abduction: 4+  Adduction: 4+  External rotation: 4+    Right Hip   Planes of Motion   Flexion: 4+  Extension: 4  Abduction: 4+  Adduction: 4+  External rotation: 4+    Left Knee   Flexion: 5  Extension: 5    Right Knee   Flexion: 5  Extension: 5    Left Ankle/Foot   Dorsiflexion: 5  Plantar flexion: 5  Great toe extension: 5    Right Ankle/Foot   Dorsiflexion: 5  Plantar flexion: 5  Great toe extension: 5    Tests     Lumbar     Left   Negative crossed SLR, passive SLR and slump test.     Right   Negative crossed SLR, passive SLR and slump test.     Left Pelvic Girdle/Sacrum   Negative: active SLR test. Right Pelvic Girdle/Sacrum   Negative: active SLR test.     Left Hip   Negative DEVON and long sit. Right Hip   Negative DEVON and long sit.             Precautions: h/o lumbar fusion  POC exp 8/14/23  Manuals 7/17 7/20 7/7 7/10 7/13                                   Neuro Re-Ed        Supine TrAb bracing with pball   Red ball  5" x30 Red ball  5" x30 Red ball  5" x30   PPT        TrAb bracing with iso hip abd Grey TB  5" 3x10 Grey TB  5" 3x10 Grey  5" 3x10 Grey  5" 3x10 Grey   5" 3x10   TrAb bracing with iso hip add Yellow ball  5" 3x10 Yellow ball  5" 2x10 Yellow ball 5"  3x10 yellow  5"  3x10 Yellow  5" 3x10   TrAb bracing with marches 3# 3x10 ea 3# 5" 3x10 ea 3# 5"   2x10 ea 3# 5" 2x10 ea 2.5# 3x10 ea   TrAb bracing with dead bug 3# 3x10 ea 3/3# 5" 3x10 ea 3/3# 5" 2x10 ea 3#/3# 5" 2x10 ea 2.5#/3#  3x10 ea   Bridges 5" 3x10 5" 3x10 5" 3x10 5" 3x10 5" 3x10           Seated alt knee ext w/TrAb bracing Seated on dynadsic    x20 ea Seated on dynadisc    2x10 ea Seated on dynadisc    2x10 ea Seated on dynadisc EOT    x20 ea Seated on dynadisc EOT    x20 ea           Standing TrAb bracing with TB pull downs and TB Rows Seated on pball   Black 5" x20 ea Seated on pball  Black  TB  5" x20 ea Seated on pball  Black  TB  5" x20 ea Seated on pball  Black  TB  5" x20 ea Seated on pball  Black TB  5" x20 ea   Standing TrAb bracing with TB anti-trunk rotation Green TB  5" x20 ea dir Green  TB 5" x10 ea dir Blue TB 5" x20 ea dir BTB 5" x20 ea dir Green TB  5" x20 ea dir   Standing TrAb bracing with arm raises (shoulder flexion and abduction)                        Ther Ex        NuStep for strength and endurance L6 x10 mins L7 x5.5 mins,  L6 x4.5 mins Resume NV L5 x10 min L6 x10 mins   SKTC stretch                                                        Ther Activity        Fwd step ups        Lat step ups        Mini squats                        Gait Training                        Modalities

## 2023-07-24 ENCOUNTER — APPOINTMENT (OUTPATIENT)
Dept: PHYSICAL THERAPY | Facility: CLINIC | Age: 73
End: 2023-07-24
Payer: MEDICARE

## 2023-07-27 ENCOUNTER — APPOINTMENT (OUTPATIENT)
Dept: PHYSICAL THERAPY | Facility: CLINIC | Age: 73
End: 2023-07-27
Payer: MEDICARE

## 2023-08-06 ENCOUNTER — OFFICE VISIT (OUTPATIENT)
Dept: URGENT CARE | Facility: CLINIC | Age: 73
End: 2023-08-06
Payer: MEDICARE

## 2023-08-06 VITALS
TEMPERATURE: 98.2 F | SYSTOLIC BLOOD PRESSURE: 124 MMHG | OXYGEN SATURATION: 96 % | HEART RATE: 84 BPM | DIASTOLIC BLOOD PRESSURE: 82 MMHG | RESPIRATION RATE: 18 BRPM

## 2023-08-06 DIAGNOSIS — H60.11 CELLULITIS OF RIGHT EXTERNAL EAR: Primary | ICD-10-CM

## 2023-08-06 PROCEDURE — 99213 OFFICE O/P EST LOW 20 MIN: CPT | Performed by: NURSE PRACTITIONER

## 2023-08-06 PROCEDURE — G0463 HOSPITAL OUTPT CLINIC VISIT: HCPCS | Performed by: NURSE PRACTITIONER

## 2023-08-06 RX ORDER — LEVOFLOXACIN 500 MG/1
500 TABLET, FILM COATED ORAL EVERY 24 HOURS
Qty: 7 TABLET | Refills: 0 | Status: SHIPPED | OUTPATIENT
Start: 2023-08-06 | End: 2023-08-13

## 2023-08-06 RX ORDER — NEOMYCIN SULFATE, POLYMYXIN B SULFATE AND DEXAMETHASONE 3.5; 10000; 1 MG/ML; [USP'U]/ML; MG/ML
SUSPENSION/ DROPS OPHTHALMIC
Qty: 5 ML | Refills: 1 | Status: SHIPPED | OUTPATIENT
Start: 2023-08-06

## 2023-08-06 RX ORDER — CHOLECALCIFEROL (VITAMIN D3) 10(400)/ML
DROPS ORAL
COMMUNITY

## 2023-08-06 RX ORDER — NEOMYCIN/POLYMYXIN B/HYDROCORT 3.5-10K-1
SUSPENSION, DROPS(FINAL DOSAGE FORM)(ML) OPHTHALMIC (EYE)
Qty: 7.5 ML | Refills: 0 | Status: SHIPPED | OUTPATIENT
Start: 2023-08-06 | End: 2023-08-06

## 2023-08-06 NOTE — PATIENT INSTRUCTIONS
Cellulitis   AMBULATORY CARE:   Cellulitis  is a skin infection caused by bacteria. Cellulitis is common and can become severe. Cellulitis usually appears on the lower legs. It can also appear on the arms, face, and other areas. Cellulitis develops when bacteria enter a crack or break in your skin, such as a scratch, bite, or cut. Common signs and symptoms:  Signs and symptoms usually appear on one side of your body. You may have any of the following:  A fever    A red, warm, swollen area on your skin    Pain when the area is touched    Red spots, bumps, or blisters    Bumpy, raised skin that feels like an orange peel    Seek care immediately if:   Your wound gets larger and more painful. You feel a crackling under your skin when you touch it. You have purple dots or bumps on your skin, or you see bleeding under your skin. You see red streaks coming from the infected area. Call your doctor if:   The red, warm, swollen area gets larger. Your fever or pain does not go away or gets worse. The area does not get smaller after 3 days of antibiotics. You have questions or concerns about your condition or care. Treatment:  You should start to see improvement in 3 days. If your cellulitis is severe, you may need IV antibiotics in the hospital. If cellulitis is not treated, the infection can spread through your body and become life-threatening. You may need any of the following medicines:  Antibiotics  help treat a bacterial infection. Acetaminophen  decreases pain and fever. It is available without a doctor's order. Ask how much to take and how often to take it. Follow directions. Read the labels of all other medicines you are using to see if they also contain acetaminophen, or ask your doctor or pharmacist. Acetaminophen can cause liver damage if not taken correctly. NSAIDs , such as ibuprofen, help decrease swelling, pain, and fever.  This medicine is available with or without a doctor's order. NSAIDs can cause stomach bleeding or kidney problems in certain people. If you take blood thinner medicine, always ask your healthcare provider if NSAIDs are safe for you. Always read the medicine label and follow directions. Take your medicine as directed. Contact your healthcare provider if you think your medicine is not helping or if you have side effects. Tell your provider if you are allergic to any medicine. Keep a list of the medicines, vitamins, and herbs you take. Include the amounts, and when and why you take them. Bring the list or the pill bottles to follow-up visits. Carry your medicine list with you in case of an emergency. Self-care:   Wash the area with soap and water every day. Gently pat dry. Use bandages if directed by your healthcare provider. Apply cream or ointment as directed. These help protect the area. Most over-the-counter products, such as petroleum jelly, are good to use. Ask your healthcare provider about specific creams or ointments you should use. Place a cool, damp cloth on the area. Use clean cloths and clean water. You can do this as often as you need to. Cool, damp cloths may help decrease pain. Elevate the area above the level of your heart  as often as you can. This will help decrease swelling and pain. Prop the area on pillows or blankets to keep it elevated comfortably. Prevent cellulitis:   Do not scratch bug bites or areas of injury. You increase your risk for cellulitis by scratching these areas. Do not share personal items, such as towels, clothing, and razors. Clean exercise equipment  with germ-killing  before and after you use it. Treat athlete's foot. This can help prevent the spread of a bacterial skin infection. Wash your hands often. Use soap and water. Wash your hands after you use the bathroom, change a child's diapers, or sneeze. Wash your hands before you prepare or eat food.  Use lotion to prevent dry, cracked skin. Follow up with your doctor within 3 days, or as directed:  He or she will check if your cellulitis is getting better. Write down your questions so you remember to ask them during your visits. © Copyright [facility] [employer] 2022 Information is for End User's use only and may not be sold, redistributed or otherwise used for commercial purposes. The above information is an  only. It is not intended as medical advice for individual conditions or treatments. Talk to your doctor, nurse or pharmacist before following any medical regimen to see if it is safe and effective for you.

## 2023-08-06 NOTE — PROGRESS NOTES
[facility]        NAME: Dewey Willis is a 67 y.o. female  : 1950    MRN: [id number]  DATE: 2023  TIME: 11:41 AM      Assessment and Plan     Cellulitis of right external ear [H60.11]  1. Cellulitis of right external ear  levofloxacin (LEVAQUIN) 500 mg tablet    neomycin-polymyxin-dexamethasone (MAXITROL) ophthalmic suspension    DISCONTINUED: neomycin-polymyxin-hydrocortisone (CORTISPORIN) otic solution    DISCONTINUED: neomycin-polymyxin-hydrocortisone (CORTISPORIN) 0.35%-10,000 units/mL-1% ophthalmic suspension            Patient Instructions     Patient Instructions     Cellulitis   AMBULATORY CARE:   Cellulitis  is a skin infection caused by bacteria. Cellulitis is common and can become severe. Cellulitis usually appears on the lower legs. It can also appear on the arms, face, and other areas. Cellulitis develops when bacteria enter a crack or break in your skin, such as a scratch, bite, or cut. Common signs and symptoms:  Signs and symptoms usually appear on one side of your body. You may have any of the following:  • A fever    • A red, warm, swollen area on your skin    • Pain when the area is touched    • Red spots, bumps, or blisters    • Bumpy, raised skin that feels like an orange peel    Seek care immediately if:   • Your wound gets larger and more painful. • You feel a crackling under your skin when you touch it. • You have purple dots or bumps on your skin, or you see bleeding under your skin. • You see red streaks coming from the infected area. Call your doctor if:   • The red, warm, swollen area gets larger. • Your fever or pain does not go away or gets worse. • The area does not get smaller after 3 days of antibiotics. • You have questions or concerns about your condition or care. Treatment:  You should start to see improvement in 3 days.  If your cellulitis is severe, you may need IV antibiotics in the hospital. If cellulitis is not treated, the infection can spread through your body and become life-threatening. You may need any of the following medicines:  • Antibiotics  help treat a bacterial infection. • Acetaminophen  decreases pain and fever. It is available without a doctor's order. Ask how much to take and how often to take it. Follow directions. Read the labels of all other medicines you are using to see if they also contain acetaminophen, or ask your doctor or pharmacist. Acetaminophen can cause liver damage if not taken correctly. • NSAIDs , such as ibuprofen, help decrease swelling, pain, and fever. This medicine is available with or without a doctor's order. NSAIDs can cause stomach bleeding or kidney problems in certain people. If you take blood thinner medicine, always ask your healthcare provider if NSAIDs are safe for you. Always read the medicine label and follow directions. • Take your medicine as directed. Contact your healthcare provider if you think your medicine is not helping or if you have side effects. Tell your provider if you are allergic to any medicine. Keep a list of the medicines, vitamins, and herbs you take. Include the amounts, and when and why you take them. Bring the list or the pill bottles to follow-up visits. Carry your medicine list with you in case of an emergency. Self-care:   • Wash the area with soap and water every day. Gently pat dry. Use bandages if directed by your healthcare provider. • Apply cream or ointment as directed. These help protect the area. Most over-the-counter products, such as petroleum jelly, are good to use. Ask your healthcare provider about specific creams or ointments you should use. • Place a cool, damp cloth on the area. Use clean cloths and clean water. You can do this as often as you need to. Cool, damp cloths may help decrease pain. • Elevate the area above the level of your heart  as often as you can. This will help decrease swelling and pain. Prop the area on pillows or blankets to keep it elevated comfortably. Prevent cellulitis:   • Do not scratch bug bites or areas of injury. You increase your risk for cellulitis by scratching these areas. • Do not share personal items, such as towels, clothing, and razors. • Clean exercise equipment  with germ-killing  before and after you use it. • Treat athlete's foot. This can help prevent the spread of a bacterial skin infection. • Wash your hands often. Use soap and water. Wash your hands after you use the bathroom, change a child's diapers, or sneeze. Wash your hands before you prepare or eat food. Use lotion to prevent dry, cracked skin. Follow up with your doctor within 3 days, or as directed:  He or she will check if your cellulitis is getting better. Write down your questions so you remember to ask them during your visits. © Copyright [employer]gor[employer] 2022 Information is for End User's use only and may not be sold, redistributed or otherwise used for commercial purposes. The above information is an  only. It is not intended as medical advice for individual conditions or treatments. Talk to your doctor, nurse or pharmacist before following any medical regimen to see if it is safe and effective for you. Follow up with PCP in 3-5 days. Proceed to  ER if symptoms worsen. Chief Complaint     Chief Complaint   Patient presents with   • swollen ear     Started yesterday am, ear hurt, area red edematous, low grade temp yesterday, achey, hx of psoriasis, affecting hearing           History of Present Illness     Onset of swollen, red right ear yesterday getting worse not better--warm and tender. Hearing decreased in that ear. Notes hx psoriasis but states it has never looked like this. No drainage. Also notes low grade temp last night and feeling mildly achy then. Notes a slight sore throat on Thurs--thought it was from allergies after taking a mine tour. Sore throat resolved that evening but the headache started then and has been present since, waxing and waning. Review of Systems     Review of Systems   HENT: Positive for ear pain, facial swelling (ear) and sore throat (resolved). Negative for ear discharge. Musculoskeletal: Positive for myalgias. Neurological: Positive for headaches. All other systems reviewed and are negative.         Current Medications       Current Outpatient Medications:   •  acetaminophen (TYLENOL) 650 mg CR tablet, Take 650 mg by mouth Take four pills daliy (2 in the am and 2 in the pm) for arthritic pain., Disp: , Rfl:   •  albuterol (PROVENTIL HFA,VENTOLIN HFA) 90 mcg/act inhaler, Inhale 2 puffs every 6 (six) hours as needed for wheezing, Disp: , Rfl:   •  aspirin 81 mg chewable tablet, Chew 81 mg daily, Disp: , Rfl:   •  atorvastatin (LIPITOR) 80 mg tablet, Take 80 mg by mouth daily, Disp: , Rfl:   •  betamethasone, augmented, (DIPROLENE) 0.05 % lotion, Apply topically if needed, Disp: , Rfl:   •  Biotin 2500 MCG CAPS, Take 2,500 mcg by mouth in the morning, Disp: , Rfl:   •  cholecalciferol (VITAMIN D) 400 units/1 mL, , Disp: , Rfl:   •  gabapentin (NEURONTIN) 100 mg capsule, Take 100 mg by mouth 3 (three) times a day, Disp: , Rfl:   •  levofloxacin (LEVAQUIN) 500 mg tablet, Take 1 tablet (500 mg total) by mouth every 24 hours for 7 days, Disp: 7 tablet, Rfl: 0  •  levothyroxine 150 mcg tablet, Take 150 mcg by mouth daily, Disp: , Rfl:   •  loratadine (CLARITIN REDITABS) 10 MG dissolvable tablet, Take 10 mg by mouth daily, Disp: , Rfl:   •  losartan (COZAAR) 50 mg tablet, Take 100 mg by mouth in the morning, Disp: , Rfl:   •  metoprolol succinate (TOPROL-XL) 25 mg 24 hr tablet, Take 25 mg by mouth daily, Disp: , Rfl:   •  montelukast (SINGULAIR) 10 mg tablet, Take 10 mg by mouth daily at bedtime, Disp: , Rfl:   •  Multiple Vitamin (MULTI-VITAMIN DAILY PO), Take by mouth, Disp: , Rfl:   •  neomycin-polymyxin-dexamethasone (MAXITROL) ophthalmic suspension, 4 drops to right ear 4 times per day, Disp: 5 mL, Rfl: 1  •  omeprazole (PriLOSEC) 20 mg delayed release capsule, Take 20 mg by mouth 2 (two) times a day, Disp: , Rfl:   •  spironolactone (ALDACTONE) 25 mg tablet, Take 25 mg by mouth daily, Disp: , Rfl:   •  Doxycycline Hyclate 50 MG TABS, Take 50 mg by mouth in the morning (Patient not taking: Reported on 8/6/2023), Disp: , Rfl:     Current Allergies     Allergies as of 08/06/2023 - Reviewed 08/06/2023   Allergen Reaction Noted   • Banana - food allergy Other (See Comments) 05/10/2006   • Naproxen  09/14/2020   • Other Other (See Comments)    • Penicillins Other (See Comments) 05/10/2006   • Tetracycline Other (See Comments) 05/10/2006              The following portions of the patient's history were reviewed and updated as appropriate: allergies, current medications, past family history, past medical history, past social history, past surgical history and problem list.     Past Medical History:   Diagnosis Date   • Asthma    • Coronary artery disease    • Degenerative disc disease, lumbar    • Diabetes mellitus ([address])     prediabetic   • DVT (deep venous thrombosis) ([facility])    • GERD (gastroesophageal reflux disease)    • Hyperlipidemia    • Hypertension    • Hypothyroid    • Osteoarthritis    • Psoriasis    • SOB (shortness of breath)        Past Surgical History:   Procedure Laterality Date   • COLONOSCOPY  07/10/2020    wnl - no polyps, only diverticulosis; Dr Raj Rehman   • CORONARY ANGIOPLASTY WITH STENT PLACEMENT  05/11/2016    Normal LM, normal LAD, normal LCx, 95% mid RCA with delayed antegrade flow; s/p ad hoc PC and KLAUDIA to RCA via right radial artery. 0% post stent residual, PORFIRIO 3 flow.    • FOOT SURGERY      foot repair   • JOINT REPLACEMENT Right 2006    TKA   • JOINT REPLACEMENT Left 2012    TKA   • KNEE SURGERY     • LUMBAR FUSION     • REPLACEMENT TOTAL KNEE Bilateral    • SHOULDER SURGERY     • TOTAL ABDOMINAL HYSTERECTOMY W/ BILATERAL SALPINGOOPHORECTOMY     • WEILBY THUMB ARTHROPLASTY         Family History   Problem Relation Age of Onset   • Heart failure Mother    • Coronary artery disease Mother    • Diabetes Mother    • Hypertension Mother    • Stroke Father    • Hypertension Father    • Heart failure Sister    • Coronary artery disease Sister    • Diabetes Sister    • Hypertension Sister    • Coronary artery disease Maternal Grandmother    • Hypertension Maternal Grandmother    • Coronary artery disease Maternal Grandfather    • Stomach cancer Paternal Grandfather    • Breast cancer Neg Hx    • Colon cancer Neg Hx    • Ovarian cancer Neg Hx    • Uterine cancer Neg Hx          Medications have been verified. Objective     /82   Pulse 84   Temp 98.2 °F (36.8 °C)   Resp 18   SpO2 96%   No LMP recorded. Patient has had a hysterectomy. Physical Exam     Physical Exam  Vitals and nursing note reviewed. Constitutional:       General: She is not in acute distress. Appearance: Normal appearance. She is well-developed. She is not ill-appearing, toxic-appearing or diaphoretic. HENT:      Head: Normocephalic and atraumatic. Right Ear: Tympanic membrane normal. Decreased hearing noted. Swelling and tenderness present. No drainage. Left Ear: External ear normal. No tenderness. Ears:      Comments: Significant redness, swelling, warmth of the right external auricle and of the right ear canal.  TM WNL. Eyes:      Pupils: Pupils are equal, round, and reactive to light. Pulmonary:      Effort: Pulmonary effort is normal. No respiratory distress. Abdominal:      General: There is no distension. Palpations: Abdomen is soft. Musculoskeletal:         General: Normal range of motion. Cervical back: Normal range of motion and neck supple. Skin:     General: Skin is warm and dry. Capillary Refill: Capillary refill takes less than 2 seconds.    Neurological: General: No focal deficit present. Mental Status: She is alert and oriented to person, place, and time. Psychiatric:         Mood and Affect: Mood normal.         Behavior: Behavior normal.         Thought Content:  Thought content normal.         Judgment: Judgment normal.

## 2023-09-26 ENCOUNTER — APPOINTMENT (OUTPATIENT)
Age: 73
End: 2023-09-26
Payer: MEDICARE

## 2023-09-26 DIAGNOSIS — R73.9 HYPERGLYCEMIA: ICD-10-CM

## 2023-09-26 DIAGNOSIS — E78.5 HYPERLIPIDEMIA, UNSPECIFIED HYPERLIPIDEMIA TYPE: ICD-10-CM

## 2023-09-26 DIAGNOSIS — E55.9 VITAMIN D DEFICIENCY: ICD-10-CM

## 2023-09-26 DIAGNOSIS — I10 HYPERTENSION, UNSPECIFIED TYPE: ICD-10-CM

## 2023-09-26 DIAGNOSIS — Z79.899 ENCOUNTER FOR LONG-TERM (CURRENT) USE OF OTHER MEDICATIONS: ICD-10-CM

## 2023-09-26 DIAGNOSIS — E03.9 HYPOTHYROIDISM, UNSPECIFIED TYPE: ICD-10-CM

## 2023-09-26 LAB
25(OH)D3 SERPL-MCNC: 42.7 NG/ML (ref 30–100)
ALBUMIN SERPL BCP-MCNC: 4.2 G/DL (ref 3.5–5)
ALP SERPL-CCNC: 69 U/L (ref 34–104)
ALT SERPL W P-5'-P-CCNC: 21 U/L (ref 7–52)
ANION GAP SERPL CALCULATED.3IONS-SCNC: 9 MMOL/L
AST SERPL W P-5'-P-CCNC: 22 U/L (ref 13–39)
BILIRUB SERPL-MCNC: 0.53 MG/DL (ref 0.2–1)
BUN SERPL-MCNC: 14 MG/DL (ref 5–25)
CALCIUM SERPL-MCNC: 9.6 MG/DL (ref 8.4–10.2)
CHLORIDE SERPL-SCNC: 103 MMOL/L (ref 96–108)
CHOLEST SERPL-MCNC: 168 MG/DL
CO2 SERPL-SCNC: 29 MMOL/L (ref 21–32)
CREAT SERPL-MCNC: 0.87 MG/DL (ref 0.6–1.3)
ERYTHROCYTE [DISTWIDTH] IN BLOOD BY AUTOMATED COUNT: 12.3 % (ref 11.6–15.1)
EST. AVERAGE GLUCOSE BLD GHB EST-MCNC: 128 MG/DL
GFR SERPL CREATININE-BSD FRML MDRD: 65 ML/MIN/1.73SQ M
GLUCOSE P FAST SERPL-MCNC: 98 MG/DL (ref 65–99)
HBA1C MFR BLD: 6.1 %
HCT VFR BLD AUTO: 42.3 % (ref 34.8–46.1)
HDLC SERPL-MCNC: 87 MG/DL
HGB BLD-MCNC: 13.7 G/DL (ref 11.5–15.4)
LDLC SERPL CALC-MCNC: 63 MG/DL (ref 0–100)
MCH RBC QN AUTO: 30.7 PG (ref 26.8–34.3)
MCHC RBC AUTO-ENTMCNC: 32.4 G/DL (ref 31.4–37.4)
MCV RBC AUTO: 95 FL (ref 82–98)
NONHDLC SERPL-MCNC: 81 MG/DL
PLATELET # BLD AUTO: 188 THOUSANDS/UL (ref 149–390)
PMV BLD AUTO: 12.1 FL (ref 8.9–12.7)
POTASSIUM SERPL-SCNC: 4.6 MMOL/L (ref 3.5–5.3)
PROT SERPL-MCNC: 7.1 G/DL (ref 6.4–8.4)
RBC # BLD AUTO: 4.46 MILLION/UL (ref 3.81–5.12)
SODIUM SERPL-SCNC: 141 MMOL/L (ref 135–147)
T4 FREE SERPL-MCNC: 1.16 NG/DL (ref 0.61–1.12)
TRIGL SERPL-MCNC: 90 MG/DL
TSH SERPL DL<=0.05 MIU/L-ACNC: 3.73 UIU/ML (ref 0.45–4.5)
WBC # BLD AUTO: 4.06 THOUSAND/UL (ref 4.31–10.16)

## 2023-09-26 PROCEDURE — 82306 VITAMIN D 25 HYDROXY: CPT

## 2023-09-26 PROCEDURE — 80061 LIPID PANEL: CPT

## 2023-09-26 PROCEDURE — 84443 ASSAY THYROID STIM HORMONE: CPT

## 2023-09-26 PROCEDURE — 85027 COMPLETE CBC AUTOMATED: CPT

## 2023-09-26 PROCEDURE — 36415 COLL VENOUS BLD VENIPUNCTURE: CPT

## 2023-09-26 PROCEDURE — 84439 ASSAY OF FREE THYROXINE: CPT

## 2023-09-26 PROCEDURE — 83036 HEMOGLOBIN GLYCOSYLATED A1C: CPT

## 2023-09-26 PROCEDURE — 80053 COMPREHEN METABOLIC PANEL: CPT

## 2023-10-05 DIAGNOSIS — Z12.31 ENCOUNTER FOR SCREENING MAMMOGRAM FOR BREAST CANCER: ICD-10-CM

## 2023-12-22 ENCOUNTER — OFFICE VISIT (OUTPATIENT)
Dept: URGENT CARE | Facility: CLINIC | Age: 73
End: 2023-12-22
Payer: MEDICARE

## 2023-12-22 VITALS
OXYGEN SATURATION: 99 % | SYSTOLIC BLOOD PRESSURE: 137 MMHG | RESPIRATION RATE: 18 BRPM | DIASTOLIC BLOOD PRESSURE: 66 MMHG | HEART RATE: 76 BPM | TEMPERATURE: 98 F

## 2023-12-22 DIAGNOSIS — B02.9 HERPES ZOSTER WITHOUT COMPLICATION: Primary | ICD-10-CM

## 2023-12-22 PROCEDURE — 99213 OFFICE O/P EST LOW 20 MIN: CPT | Performed by: STUDENT IN AN ORGANIZED HEALTH CARE EDUCATION/TRAINING PROGRAM

## 2023-12-22 PROCEDURE — G0463 HOSPITAL OUTPT CLINIC VISIT: HCPCS | Performed by: STUDENT IN AN ORGANIZED HEALTH CARE EDUCATION/TRAINING PROGRAM

## 2023-12-22 RX ORDER — VALACYCLOVIR HYDROCHLORIDE 1 G/1
1000 TABLET, FILM COATED ORAL 3 TIMES DAILY
Qty: 21 TABLET | Refills: 0 | Status: SHIPPED | OUTPATIENT
Start: 2023-12-22 | End: 2023-12-29

## 2023-12-22 RX ORDER — CAPSAICIN 0.025 %
1 CREAM (GRAM) TOPICAL 2 TIMES DAILY PRN
Qty: 120 G | Refills: 0 | Status: SHIPPED | OUTPATIENT
Start: 2023-12-22

## 2023-12-22 RX ORDER — OXYCODONE HYDROCHLORIDE AND ACETAMINOPHEN 5; 325 MG/1; MG/1
1 TABLET ORAL EVERY 4 HOURS PRN
COMMUNITY
Start: 2023-12-20 | End: 2023-12-25

## 2023-12-22 NOTE — PROGRESS NOTES
"  [facility]        NAME: Kevin Vivar is a 73 y.o. female  : 1950    MRN: [id number]    Assessment and Plan   Herpes zoster without complication [B02.9]  1. Herpes zoster without complication  valACYclovir (VALTREX) 1,000 mg tablet    capsaicin (ZOSTRIX) 0.025 % cream        Presenting within 72 hours of symptoms onset. Will start valtrex. Discussed course of disease. Options for pain management with capsaicin, OTC lidocaine, cold compress. NSAIDs contraindicated. Provided warning signs of postherpetic neuralgia. Discussed contagion precautions.     Patient Instructions     See wrap up for details  Follow up with PCP in 3-5 days.  Proceed to  ER if symptoms worsen.    Chief Complaint     Chief Complaint   Patient presents with    Headache     C/o \"pain to the base of my skull\" onset \"Tuesday\" with \"blister rash that started last night\". Denies PCP contact. Denies assessing for fever. Denies fall/trauma. Last dose \"Tylenol today around 8 p.m.\". Reports hx of shingle, pt cannot recall last shingle rash.          History of Present Illness       HPI    Onset of symptoms 3 days ago with tingling and pain over right posterior neck. Has not tried anything. Hx of shingles on left abdomen many years ago. Has gotten the old shingles vaccine but not the new one Shingrix.       Review of Systems   Review of Systems   Constitutional:  Negative for chills and fever.   HENT:  Negative for ear pain and sore throat.    Eyes:  Negative for pain and visual disturbance.   Respiratory:  Negative for cough, chest tightness and shortness of breath.    Cardiovascular:  Negative for chest pain and palpitations.   Gastrointestinal:  Negative for abdominal pain, constipation, diarrhea, nausea and vomiting.   Genitourinary:  Negative for dysuria, hematuria and menstrual problem.   Musculoskeletal:  Negative for arthralgias and back pain.   Skin:  Positive for rash. Negative for color change.   Neurological:  Negative for " seizures and syncope.   Psychiatric/Behavioral:  Negative for dysphoric mood and suicidal ideas.    All other systems reviewed and are negative.      Current Medications       Current Outpatient Medications:     capsaicin (ZOSTRIX) 0.025 % cream, Apply 1 Application topically 2 (two) times a day as needed for mild pain, Disp: 120 g, Rfl: 0    levothyroxine 150 mcg tablet, Take 175 mcg by mouth daily, Disp: , Rfl:     oxyCODONE-acetaminophen (PERCOCET) 5-325 mg per tablet, Take 1 tablet by mouth every 4 (four) hours as needed, Disp: , Rfl:     rivaroxaban (XARELTO) 20 mg tablet, Take 20 mg by mouth, Disp: , Rfl:     valACYclovir (VALTREX) 1,000 mg tablet, Take 1 tablet (1,000 mg total) by mouth 3 (three) times a day for 7 days, Disp: 21 tablet, Rfl: 0    acetaminophen (TYLENOL) 650 mg CR tablet, Take 650 mg by mouth Take four pills daliy (2 in the am and 2 in the pm) for arthritic pain., Disp: , Rfl:     albuterol (PROVENTIL HFA,VENTOLIN HFA) 90 mcg/act inhaler, Inhale 2 puffs every 6 (six) hours as needed for wheezing, Disp: , Rfl:     aspirin 81 mg chewable tablet, Chew 81 mg daily, Disp: , Rfl:     atorvastatin (LIPITOR) 80 mg tablet, Take 80 mg by mouth daily, Disp: , Rfl:     betamethasone, augmented, (DIPROLENE) 0.05 % lotion, Apply topically if needed, Disp: , Rfl:     Biotin 2500 MCG CAPS, Take 2,500 mcg by mouth in the morning, Disp: , Rfl:     cholecalciferol (VITAMIN D) 400 units/1 mL, , Disp: , Rfl:     Doxycycline Hyclate 50 MG TABS, Take 50 mg by mouth in the morning (Patient not taking: Reported on 8/6/2023), Disp: , Rfl:     gabapentin (NEURONTIN) 100 mg capsule, Take 100 mg by mouth 3 (three) times a day, Disp: , Rfl:     loratadine (CLARITIN REDITABS) 10 MG dissolvable tablet, Take 10 mg by mouth daily, Disp: , Rfl:     losartan (COZAAR) 50 mg tablet, Take 100 mg by mouth in the morning, Disp: , Rfl:     metoprolol succinate (TOPROL-XL) 25 mg 24 hr tablet, Take 25 mg by mouth daily, Disp: , Rfl:      montelukast (SINGULAIR) 10 mg tablet, Take 10 mg by mouth daily at bedtime, Disp: , Rfl:     Multiple Vitamin (MULTI-VITAMIN DAILY PO), Take by mouth, Disp: , Rfl:     neomycin-polymyxin-dexamethasone (MAXITROL) ophthalmic suspension, 4 drops to right ear 4 times per day, Disp: 5 mL, Rfl: 1    omeprazole (PriLOSEC) 20 mg delayed release capsule, Take 20 mg by mouth 2 (two) times a day, Disp: , Rfl:     spironolactone (ALDACTONE) 25 mg tablet, Take 25 mg by mouth daily, Disp: , Rfl:     Current Allergies     Allergies as of 12/22/2023 - Reviewed 12/22/2023   Allergen Reaction Noted    Banana - food allergy Other (See Comments) 05/10/2006    Naproxen  09/14/2020    Other Other (See Comments)     Penicillins Other (See Comments) 05/10/2006    Tetracycline Other (See Comments) 05/10/2006            The following portions of the patient's history were reviewed and updated as appropriate: allergies, current medications, past family history, past medical history, past social history, past surgical history and problem list.     Past Medical History:   Diagnosis Date    Asthma     Coronary artery disease     Degenerative disc disease, lumbar     Diabetes mellitus (HCC)     prediabetic    DVT (deep venous thrombosis) ([facility])     GERD (gastroesophageal reflux disease)     Hyperlipidemia     Hypertension     Hypothyroid     Osteoarthritis     Psoriasis     SOB (shortness of breath)        Past Surgical History:   Procedure Laterality Date    COLONOSCOPY  07/10/2020    wnl - no polyps, only diverticulosis; Dr Kerwin Aj    CORONARY ANGIOPLASTY WITH STENT PLACEMENT  05/11/2016    Normal LM, normal LAD, normal LCx, 95% mid RCA with delayed antegrade flow; s/p ad hoc PC and KLAUDIA to RCA via right radial artery. 0% post stent residual, PORFIRIO 3 flow.    FOOT SURGERY      foot repair    JOINT REPLACEMENT Right 2006    TKA    JOINT REPLACEMENT Left 2012    TKA    KNEE SURGERY      LUMBAR FUSION      REPLACEMENT TOTAL KNEE Bilateral      SHOULDER SURGERY      TOTAL ABDOMINAL HYSTERECTOMY W/ BILATERAL SALPINGOOPHORECTOMY      WEILBY THUMB ARTHROPLASTY         Family History   Problem Relation Age of Onset    Heart failure Mother     Coronary artery disease Mother     Diabetes Mother     Hypertension Mother     Stroke Father     Hypertension Father     Heart failure Sister     Coronary artery disease Sister     Diabetes Sister     Hypertension Sister     Coronary artery disease Maternal Grandmother     Hypertension Maternal Grandmother     Coronary artery disease Maternal Grandfather     Stomach cancer Paternal Grandfather     Breast cancer Neg Hx     Colon cancer Neg Hx     Ovarian cancer Neg Hx     Uterine cancer Neg Hx          Medications have been verified.        Objective   /66 (BP Location: Left arm, Patient Position: Sitting)   Pulse 76   Temp 98 °F (36.7 °C) (Temporal)   Resp 18   SpO2 99%        Physical Exam     Physical Exam  Constitutional:       General: She is not in acute distress.     Appearance: Normal appearance.   HENT:      Head: Normocephalic and atraumatic.      Right Ear: Tympanic membrane, ear canal and external ear normal. No swelling or tenderness.   Eyes:      Extraocular Movements: Extraocular movements intact.      Conjunctiva/sclera: Conjunctivae normal.   Neck:        Comments: Rash in drawn area- right lower occipital area extending to right postauricular region - clusters of raised erythematous circles with vesicles, tender to palpation, no bleeding, no discharge. No right ear involvement   Cardiovascular:      Rate and Rhythm: Normal rate.   Pulmonary:      Effort: Pulmonary effort is normal. No respiratory distress.   Skin:     General: Skin is warm and dry.   Neurological:      General: No focal deficit present.      Mental Status: She is alert and oriented to person, place, and time.   Psychiatric:         Mood and Affect: Mood normal.         Behavior: Behavior normal.

## 2023-12-22 NOTE — PATIENT INSTRUCTIONS
Keep affected area covered to avoid transmission   Take lidocaine 4% patches over the counter  Cool compresses over area  Talk to your PCP about varicella zoster vaccine

## 2024-01-04 ENCOUNTER — APPOINTMENT (OUTPATIENT)
Dept: LAB | Facility: HOSPITAL | Age: 74
End: 2024-01-04
Payer: MEDICARE

## 2024-01-04 DIAGNOSIS — Z01.818 OTHER SPECIFIED PRE-OPERATIVE EXAMINATION: ICD-10-CM

## 2024-01-04 LAB
ANION GAP SERPL CALCULATED.3IONS-SCNC: 7 MMOL/L
BASOPHILS # BLD AUTO: 0.02 THOUSANDS/ÂΜL (ref 0–0.1)
BASOPHILS NFR BLD AUTO: 0 % (ref 0–1)
BUN SERPL-MCNC: 21 MG/DL (ref 5–25)
CALCIUM SERPL-MCNC: 9.2 MG/DL (ref 8.4–10.2)
CHLORIDE SERPL-SCNC: 104 MMOL/L (ref 96–108)
CO2 SERPL-SCNC: 28 MMOL/L (ref 21–32)
CREAT SERPL-MCNC: 0.88 MG/DL (ref 0.6–1.3)
EOSINOPHIL # BLD AUTO: 0.14 THOUSAND/ÂΜL (ref 0–0.61)
EOSINOPHIL NFR BLD AUTO: 3 % (ref 0–6)
ERYTHROCYTE [DISTWIDTH] IN BLOOD BY AUTOMATED COUNT: 12.1 % (ref 11.6–15.1)
GFR SERPL CREATININE-BSD FRML MDRD: 64 ML/MIN/1.73SQ M
GLUCOSE P FAST SERPL-MCNC: 116 MG/DL (ref 65–99)
HCT VFR BLD AUTO: 41.2 % (ref 34.8–46.1)
HGB BLD-MCNC: 13.1 G/DL (ref 11.5–15.4)
IMM GRANULOCYTES # BLD AUTO: 0.02 THOUSAND/UL (ref 0–0.2)
IMM GRANULOCYTES NFR BLD AUTO: 0 % (ref 0–2)
LYMPHOCYTES # BLD AUTO: 1.96 THOUSANDS/ÂΜL (ref 0.6–4.47)
LYMPHOCYTES NFR BLD AUTO: 41 % (ref 14–44)
MCH RBC QN AUTO: 30.2 PG (ref 26.8–34.3)
MCHC RBC AUTO-ENTMCNC: 31.8 G/DL (ref 31.4–37.4)
MCV RBC AUTO: 95 FL (ref 82–98)
MONOCYTES # BLD AUTO: 0.64 THOUSAND/ÂΜL (ref 0.17–1.22)
MONOCYTES NFR BLD AUTO: 14 % (ref 4–12)
NEUTROPHILS # BLD AUTO: 1.95 THOUSANDS/ÂΜL (ref 1.85–7.62)
NEUTS SEG NFR BLD AUTO: 42 % (ref 43–75)
NRBC BLD AUTO-RTO: 0 /100 WBCS
PLATELET # BLD AUTO: 241 THOUSANDS/UL (ref 149–390)
PMV BLD AUTO: 10.5 FL (ref 8.9–12.7)
POTASSIUM SERPL-SCNC: 4.3 MMOL/L (ref 3.5–5.3)
RBC # BLD AUTO: 4.34 MILLION/UL (ref 3.81–5.12)
SODIUM SERPL-SCNC: 139 MMOL/L (ref 135–147)
WBC # BLD AUTO: 4.73 THOUSAND/UL (ref 4.31–10.16)

## 2024-01-04 PROCEDURE — 85025 COMPLETE CBC W/AUTO DIFF WBC: CPT

## 2024-01-04 PROCEDURE — 80048 BASIC METABOLIC PNL TOTAL CA: CPT

## 2024-01-04 PROCEDURE — 36415 COLL VENOUS BLD VENIPUNCTURE: CPT

## 2024-03-11 ENCOUNTER — APPOINTMENT (OUTPATIENT)
Dept: LAB | Facility: HOSPITAL | Age: 74
End: 2024-03-11
Payer: MEDICARE

## 2024-03-11 ENCOUNTER — OFFICE VISIT (OUTPATIENT)
Dept: CARDIOLOGY CLINIC | Facility: CLINIC | Age: 74
End: 2024-03-11
Payer: MEDICARE

## 2024-03-11 VITALS
HEART RATE: 80 BPM | HEIGHT: 67 IN | DIASTOLIC BLOOD PRESSURE: 80 MMHG | WEIGHT: 224 LBS | BODY MASS INDEX: 35.16 KG/M2 | SYSTOLIC BLOOD PRESSURE: 120 MMHG

## 2024-03-11 DIAGNOSIS — E78.2 MIXED HYPERLIPIDEMIA: ICD-10-CM

## 2024-03-11 DIAGNOSIS — I25.10 CORONARY ARTERY DISEASE INVOLVING NATIVE CORONARY ARTERY OF NATIVE HEART WITHOUT ANGINA PECTORIS: Primary | ICD-10-CM

## 2024-03-11 DIAGNOSIS — I10 ESSENTIAL HYPERTENSION: ICD-10-CM

## 2024-03-11 DIAGNOSIS — E03.9 HYPOTHYROIDISM, UNSPECIFIED TYPE: ICD-10-CM

## 2024-03-11 LAB
T4 FREE SERPL-MCNC: 1.32 NG/DL (ref 0.61–1.12)
TSH SERPL DL<=0.05 MIU/L-ACNC: 0.23 UIU/ML (ref 0.45–4.5)

## 2024-03-11 PROCEDURE — 36415 COLL VENOUS BLD VENIPUNCTURE: CPT

## 2024-03-11 PROCEDURE — 84439 ASSAY OF FREE THYROXINE: CPT

## 2024-03-11 PROCEDURE — 84443 ASSAY THYROID STIM HORMONE: CPT

## 2024-03-11 PROCEDURE — 99214 OFFICE O/P EST MOD 30 MIN: CPT | Performed by: INTERNAL MEDICINE

## 2024-03-11 RX ORDER — BUDESONIDE AND FORMOTEROL FUMARATE DIHYDRATE 160; 4.5 UG/1; UG/1
2 AEROSOL RESPIRATORY (INHALATION) 2 TIMES DAILY
COMMUNITY

## 2024-03-11 RX ORDER — LEVOTHYROXINE SODIUM 175 UG/1
150 TABLET ORAL DAILY
COMMUNITY
Start: 2023-12-20

## 2024-03-11 NOTE — PROGRESS NOTES
" Patient ID: Kevin Vivar is a 73 y.o. female.        Plan:      Coronary disease  Prior RCA stent.  No current symptoms.    Hyperlipidemia  Tolerating high intensity statin therapy.    Essential hypertension  Well controlled and would continue current regimen.       Follow up Plan/Other summary comments:  1 year return.    HPI:   Patient is seen in follow-up today regarding the above.  Since last visit she has felt well.  No chest pain or chest pressure.  No shortness of breath syncope or near syncope.  She had foot surgery and was nonweightbearing for several weeks.  She read lots of books.    On 05/11/2016, because of exertional chest pressure, she underwent coronary angiography.  There was tight stenosis of the mid right coronary artery which was stented.  Other vessels looked okay.  She has not had similar symptoms since.    EKG 1/8/2024 at Dr. Lopez office reviewed by me sinus rhythm with a right bundle branch block pattern.  No change from prior.      Most recent or relevant cardiac/vascular testing:    Stress-echo 2/7/2023: WNL.      Past Surgical History:   Procedure Laterality Date    COLONOSCOPY  07/10/2020    wnl - no polyps, only diverticulosis; Dr Kerwin Aj    CORONARY ANGIOPLASTY WITH STENT PLACEMENT  05/11/2016    Normal LM, normal LAD, normal LCx, 95% mid RCA with delayed antegrade flow; s/p ad hoc PC and KLAUDIA to RCA via right radial artery. 0% post stent residual, PORFIRIO 3 flow.    FOOT SURGERY      foot repair    JOINT REPLACEMENT Right 2006    TKA    JOINT REPLACEMENT Left 2012    TKA    KNEE SURGERY      LUMBAR FUSION      REPLACEMENT TOTAL KNEE Bilateral     SHOULDER SURGERY      TOTAL ABDOMINAL HYSTERECTOMY W/ BILATERAL SALPINGOOPHORECTOMY      WEILBY THUMB ARTHROPLASTY         Lipid Profile: Reviewed      Review of Systems   10  point ROS  was otherwise non pertinent or negative except as per HPI or as below.   Gait:  Normal.        Objective:     /80   Pulse 80   Ht 5' 7\" " (1.702 m)   Wt 102 kg (224 lb)   BMI 35.08 kg/m²     PHYSICAL EXAM:    General:  Normal appearance in no distress.  Eyes:  Anicteric.  Oral mucosa:  Moist.  Neck:  No JVD. Carotid upstrokes are brisk without bruits.  No masses.  Chest:  Clear to auscultation.  Cardiac:  No palpable PMI.  Normal S1 and S2.  No murmur gallop or rub.  Abdomen:  Soft and nontender. No palpable organomegaly or aortic enlargement.  Extremities:  No peripheral edema.  Musculoskeletal:  Symmetric.   Vascular:  Femoral pulses are brisk without bruits.  Popliteal pulses are intact bilaterally.   Pedal pulses are intact.  Neuro:  Grossly symmetric.  Psych:  Alert and oriented x3.      Meds reviewed.    Past Medical History:   Diagnosis Date    Asthma     Coronary artery disease     Degenerative disc disease, lumbar     Diabetes mellitus (HCC)     prediabetic    DVT (deep venous thrombosis) (HCC)     GERD (gastroesophageal reflux disease)     Hyperlipidemia     Hypertension     Hypothyroid     Osteoarthritis     Psoriasis     SOB (shortness of breath)            Social History     Tobacco Use   Smoking Status Former    Current packs/day: 0.00    Types: Cigarettes    Quit date:     Years since quittin.2    Passive exposure: Never   Smokeless Tobacco Never

## 2024-03-18 ENCOUNTER — ANNUAL EXAM (OUTPATIENT)
Dept: OBGYN CLINIC | Facility: CLINIC | Age: 74
End: 2024-03-18
Payer: MEDICARE

## 2024-03-18 VITALS
BODY MASS INDEX: 35.09 KG/M2 | SYSTOLIC BLOOD PRESSURE: 140 MMHG | WEIGHT: 223.6 LBS | HEIGHT: 67 IN | DIASTOLIC BLOOD PRESSURE: 82 MMHG

## 2024-03-18 DIAGNOSIS — Z01.419 ENCOUNTER FOR GYNECOLOGICAL EXAMINATION WITHOUT ABNORMAL FINDING: Primary | ICD-10-CM

## 2024-03-18 DIAGNOSIS — Z12.31 ENCOUNTER FOR SCREENING MAMMOGRAM FOR BREAST CANCER: ICD-10-CM

## 2024-03-18 PROCEDURE — G0101 CA SCREEN;PELVIC/BREAST EXAM: HCPCS | Performed by: NURSE PRACTITIONER

## 2024-03-18 NOTE — PATIENT INSTRUCTIONS
Weight Management   WHAT YOU NEED TO KNOW:   Being overweight increases your risk of health conditions such as heart disease, high blood pressure, type 2 diabetes, and certain types of cancer. It can also increase your risk for osteoarthritis, sleep apnea, and other respiratory problems. Aim for a slow, steady weight loss. Even a small amount of weight loss can lower your risk of health problems.  DISCHARGE INSTRUCTIONS:   How to lose weight safely:  A safe and healthy way to lose weight is to eat fewer calories and get regular exercise.  You can lose up about 1 pound a week by decreasing the number of calories you eat by 500 calories each day. You can decrease calories by eating smaller portion sizes or by cutting out high-calorie foods. Read labels to find out how many calories are in the foods you eat.         You can also burn calories with exercise such as walking, swimming, or biking. You will be more likely to keep weight off if you make these changes part of your lifestyle. Exercise at least 30 minutes per day on most days of the week. You can also fit in more physical activity by taking the stairs instead of the elevator or parking farther away from stores. Ask your healthcare provider about the best exercise plan for you.       Healthy meal plan for weight management:  A healthy meal plan includes a variety of foods, contains fewer calories, and helps you stay healthy. A healthy meal plan includes the following:     Eat whole-grain foods more often.  A healthy meal plan should contain fiber. Fiber is the part of grains, fruits, and vegetables that is not broken down by your body. Whole-grain foods are healthy and provide extra fiber in your diet. Some examples of whole-grain foods are whole-wheat breads and pastas, oatmeal, brown rice, and bulgur.    Eat a variety of vegetables every day.  Include dark, leafy greens such as spinach, kale, juana greens, and mustard greens. Eat yellow and orange vegetables  such as carrots, sweet potatoes, and winter squash.     Eat a variety of fruits every day.  Choose fresh or canned fruit (canned in its own juice or light syrup) instead of juice. Fruit juice has very little or no fiber.    Eat low-fat dairy foods.  Drink fat-free (skim) milk or 1% milk. Eat fat-free yogurt and low-fat cottage cheese. Try low-fat cheeses such as mozzarella and other reduced-fat cheeses.    Choose meat and other protein foods that are low in fat.  Choose beans or other legumes such as split peas or lentils. Choose fish, skinless poultry (chicken or turkey), or lean cuts of red meat (beef or pork). Before you cook meat or poultry, cut off any visible fat.     Use less fat and oil.  Try baking foods instead of frying them. Add less fat, such as margarine, sour cream, regular salad dressing, and mayonnaise to foods. Eat fewer high-fat foods. Some examples of high-fat foods include french fries, doughnuts, ice cream, and cakes.    Eat fewer sweets.  Limit foods and drinks that are high in sugar. This includes candy, cookies, regular soda, and sweetened drinks.  Ways to decrease calories:   Eat smaller portions.     Use a small plate with smaller servings.    Do not eat second helpings.    When you eat at a restaurant, ask for a box and place half of your meal in the box before you eat.    Share an entrée with someone else.    Replace high-calorie snacks with healthy, low-calorie snacks.     Choose fresh fruit, vegetables, fat-free rice cakes, or air-popped popcorn instead of potato chips, nuts, or chocolate.    Choose water or calorie-free drinks instead of soda or sweetened drinks.    Do not shop for groceries when you are hungry.  You may be more likely to make unhealthy food choices. Take a grocery list of healthy foods and shop after you have eaten.    Eat regular meals. Do not skip meals. Skipping meals can lead to overeating later in the day. This can make it harder for you to lose weight. Eat a  healthy snack in place of a meal if you do not have time to eat a regular meal. Talk with a dietitian to help you create a meal plan and schedule that is right for you.    Other things to consider as you try to lose weight:   Be aware of situations that may give you the urge to overeat, such as eating while watching television. Find ways to avoid these situations. For example, read a book, go for a walk, or do crafts.    Meet with a weight loss support group or friends who are also trying to lose weight. This may help you stay motivated to continue working on your weight loss goals.    © Copyright [employer] 2023 Information is for End User's use only and may not be sold, redistributed or otherwise used for commercial purposes.  The above information is an  only. It is not intended as medical advice for individual conditions or treatments. Talk to your doctor, nurse or pharmacist before following any medical regimen to see if it is safe and effective for you.  Calcium and Osteoporosis   AMBULATORY CARE:   Why calcium is important for osteoporosis:  Calcium is important for osteoporosis because calcium helps build bone mass. Osteoporosis is a long-term medical condition that causes your body to break down more bone than it makes. Your bones become weak, brittle, and more likely to fracture.   Your calcium needs:   Women:      19 to 50 years: 1,000 mg    Over 50: 1,200 mg    Pregnant or breastfeeding, 19 years to 50 years: 1,000 mg    Men:      19 to 70: 1,000 mg    Over 70: 1,200 mg    Foods that are high in calcium:  The following list shows the number of calcium milligrams (mg) per serving. Your dietitian or healthcare provider can help you create a balanced meal plan for your calcium needs.  Dairy:      1 cup of low-fat plain yogurt (415 mg) or low-fat fruit yogurt (245 to 384 mg)    1½ ounces of shredded cheddar cheese (306 mg) or part skim mozzarella cheese (275 mg)    1 cup of skim, 2%, or whole  milk (300 mg)    1 cup of cottage cheese made with 2% milk fat (138 mg)    ½ cup of frozen yogurt (103 mg)    Other foods:      1 cup of calcium-fortified orange juice (300 mg)    ½ cup of cooked juana greens (220 mg)    4 canned sardines, with bones (242 mg)    ½ cup of tofu (with added calcium) (204 mg)       How to get extra calcium:   Add powdered milk to puddings, cocoa, custard, or hot cereal.    Sift powdered milk into flour when you make cakes, cookies, or breads.    Use low-fat or fat-free milk instead of water in pancake mix, mashed potatoes, pudding, or hot breakfast cereal.    Add low-fat or fat-free cheese to salad, soup, or pasta.    Add tofu (with added calcium) to vegetable stir-pelaez.    Take calcium supplements if you cannot get enough calcium from the foods you eat. Your body can absorb the most calcium from supplements when you take 500 mg or less at one time. Do not take more than 2,500 mg of calcium supplements each day.    Follow up with your doctor or dietitian as directed:  Write down your questions so you remember to ask them during your visits.  © Copyright [employer] 2023 Information is for End User's use only and may not be sold, redistributed or otherwise used for commercial purposes.  The above information is an  only. It is not intended as medical advice for individual conditions or treatments. Talk to your doctor, nurse or pharmacist before following any medical regimen to see if it is safe and effective for you.

## 2024-03-18 NOTE — PROGRESS NOTES
Assessment / Plan    1. Encounter for gynecological examination without abnormal finding  Normal well woman exam.  Post benign hysterectomy/ BSO.  Cervical cancer screening is no longer indicated.  Up to date on colonoscopy and further screening is no longer indicated.  DXA normal.  Patient chooses to come in for GYN check every 2 years.    2. Encounter for screening mammogram for breast cancer    - Mammo screening bilateral w 3d & cad; Future        Subjective      Kevin Vivar is a 73 y.o. female who presents for her annual gynecologic exam.    72 yo    Hx of benign ENRIQUE/BSO; ut prolapse  Pap hx: normal  STD hx none  2023 Mammo normal  2018 DEXA: normal  Colonoscopy- 2020; no longer needs screening.    Calcium intake: lactose intolerant; eats yogurt.  given guidelines.  Exercise: unable to walk due to hammer toes, recently had surgery.  Safety: feels safe    History of abnormal Pap smear: no  Family history of breast,uterine, ovarian or colon cancer: no      Menstrual History:  OB History          2    Para   2    Term   2       0    AB   0    Living   2         SAB   0    IAB   0    Ectopic   0    Multiple   0    Live Births   2           Obstetric Comments   Menopause: surgical; , 50 years old                No LMP recorded. Patient has had a hysterectomy.       The following portions of the patient's history were reviewed and updated as appropriate: allergies, current medications, past family history, past medical history, past social history, past surgical history, and problem list.    Review of Systems      Review of Systems   Constitutional:  Negative for chills and fever.   Respiratory:  Negative for cough and shortness of breath.    Gastrointestinal:  Negative for abdominal distention, abdominal pain, blood in stool, constipation, diarrhea, nausea and vomiting.   Genitourinary:  Negative for difficulty urinating, dysuria, frequency, genital sores, hematuria, menstrual problem,  "pelvic pain, urgency, vaginal bleeding and vaginal discharge.   Musculoskeletal:  Negative for arthralgias and myalgias.     Breasts:  Negative for skin changes, dimpling, asymmetry, nipple discharge, redness, tenderness or palpable masses    Objective      /82 (BP Location: Right arm, Patient Position: Sitting, Cuff Size: Standard)   Ht 5' 7\" (1.702 m)   Wt 101 kg (223 lb 9.6 oz)   BMI 35.02 kg/m²   Physical Exam  Constitutional:       General: She is not in acute distress.     Appearance: Normal appearance. She is well-developed. She is not ill-appearing or diaphoretic.      Comments: Bmi 35   HENT:      Head: Normocephalic and atraumatic.   Eyes:      Pupils: Pupils are equal, round, and reactive to light.   Neck:      Thyroid: No thyromegaly.   Pulmonary:      Effort: Pulmonary effort is normal.   Chest:   Breasts:     Breasts are symmetrical.      Right: No inverted nipple, mass, nipple discharge, skin change or tenderness.      Left: No inverted nipple, mass, nipple discharge, skin change or tenderness.   Abdominal:      General: There is no distension.      Palpations: Abdomen is soft. There is no mass.      Tenderness: There is no abdominal tenderness. There is no guarding or rebound.   Genitourinary:     General: Normal vulva.      Exam position: Lithotomy position.      Labia:         Right: No rash, tenderness, lesion or injury.         Left: No rash, tenderness, lesion or injury.       Vagina: No signs of injury and foreign body. No vaginal discharge, erythema, tenderness or bleeding.      Comments: Cervix, uterus and ovaries are surgically absent.    Musculoskeletal:      Cervical back: Neck supple.   Lymphadenopathy:      Cervical: No cervical adenopathy.      Upper Body:      Right upper body: No supraclavicular adenopathy.      Left upper body: No supraclavicular adenopathy.   Skin:     General: Skin is warm and dry.   Neurological:      General: No focal deficit present.      Mental Status: " She is alert and oriented to person, place, and time.   Psychiatric:         Mood and Affect: Mood normal.         Behavior: Behavior normal.         Thought Content: Thought content normal.         Judgment: Judgment normal.

## 2024-04-03 ENCOUNTER — EVALUATION (OUTPATIENT)
Dept: PHYSICAL THERAPY | Facility: CLINIC | Age: 74
End: 2024-04-03
Payer: MEDICARE

## 2024-04-03 DIAGNOSIS — R26.9 GAIT ABNORMALITY: Primary | ICD-10-CM

## 2024-04-03 PROCEDURE — 97140 MANUAL THERAPY 1/> REGIONS: CPT | Performed by: PHYSICAL THERAPIST

## 2024-04-03 PROCEDURE — 97162 PT EVAL MOD COMPLEX 30 MIN: CPT | Performed by: PHYSICAL THERAPIST

## 2024-04-03 NOTE — LETTER
2024    Alonso Stuart DPM  [address] [facility] [address]    Patient: Kevin Vivar   YOB: 1950   Date of Visit: 4/3/2024     Encounter Diagnosis     ICD-10-CM    1. Gait abnormality  R26.9           Dear Dr. Stuart:    Thank you for your recent referral of Kevin Vivar. Please review the attached evaluation summary from Kevin's recent visit.     Please verify that you agree with the plan of care by signing the attached order.     If you have any questions or concerns, please do not hesitate to call.     I sincerely appreciate the opportunity to share in the care of one of your patients and hope to have another opportunity to work with you in the near future.       Sincerely,    Serjio Hodges, PT      Referring Provider:      I certify that I have read the below Plan of Care and certify the need for these services furnished under this plan of treatment while under my care.                    Alonso Stuart DPM  [address] [facility] [address]  Via Fax: [phone]          PT Evaluation     Today's date: 2024  Patient name: Kevin Vivar  : 1950  MRN: [id number]  Referring provider: Alonso Stuart DPM  Dx:   Encounter Diagnosis     ICD-10-CM    1. Gait abnormality  R26.9                      Assessment  Assessment details: Kevin Vivar is a 73 y.o. female who presents to outpatient PT with a  Gait abnormality  (primary encounter diagnosis).  No further referral appears necessary at this time based upon examination results. Pt presents with decreased strength, ROM, balance, functional activity tolerance, and pain with movement in her left foot which is  limiting her ability to perform the aforementioned functional activities.  Etiologic factors include repetitive poor body mechanics. Prognosis is good given HEP compliance and PT 2-3/wk.  Please contact me if you have any questions or recommendations.  Thank you for the opportunity to  share in  [facility].     Impairments: abnormal coordination, abnormal muscle firing, abnormal or restricted ROM, activity intolerance, impaired physical strength, lacks appropriate home exercise program, pain with function and safety issue  Functional limitations: Difficulty with ambulation  Symptom irritability: moderateUnderstanding of Dx/Px/POC: good   Prognosis: good    Goals  STG to be achieved in 4 weeks     1. Pt will reduce subjective pain rating by at least 50 percent the help facilitate return to PLOF  2. Pt will improve ROM by at least 10 degrees to help promote improved functional activity tolerance   3. Pt will improve MMT scores by at least 1/2 grade to promote improved functional activity tolerance      1. Pt will be complaint with HEP   2. Pt will improve ROM to WFL, to help facilitate independence with ADL's, IADL's, and functional activities   3. Pt will improve Strength to WFL to help facilitate independence with ADL's, IADL's, and functional activities   4. Pt will have no limitations with ambulation to help facilitate independence at home and in the community.   5. Pt will have no limitations with stair negotiation to help facilitate independence at home and in the community           Plan  Patient would benefit from: skilled physical therapy  Planned therapy interventions: ADL training, balance, balance/weight bearing training, flexibility, functional ROM exercises, gait training, graded activity, graded exercise, graded motor, home exercise program, joint mobilization, manual therapy, neuromuscular re-education, patient education, postural training, strengthening, stretching, therapeutic activities and therapeutic exercise  Frequency: 2x week  Duration in weeks: 12  Plan of Care beginning date: 4/3/2024  Plan of Care expiration date: 7/3/2024  Treatment plan discussed with: patient, PTA and referring physician        Subjective Evaluation    History of Present Illness  Mechanism of injury:  73 year old female     Presents S/P left foot surgery [facility] on  for Hammertoe correction and Solid fusion of the PIPJ toes 2,3 with pin in place.     Pt reports difficulty with walking. Ever since her surgery she has had difficulty with normalizing her gait pattern. Her goal is to improve her walking mechanics     She Is WBAT in a sneaker this date.    Patient Goals  Patient goals for therapy: increased strength, decreased pain and increased motion  Patient goal: to be able to walk a mile  Pain  No pain reported  Current pain ratin  At best pain ratin  At worst pain ratin  Quality: dull ache and sharp  Relieving factors: relaxation, support and rest  Aggravating factors: standing and walking  Progression: improved        Objective     Active Range of Motion   Left Ankle/Foot   Dorsiflexion (ke): 0 degrees   Plantar flexion: 60 degrees   Inversion: 20 degrees   Eversion: 20 degrees   Great toe flexion: 0 degrees   Great toe extension: 10 degrees   Lesser toes: 0 degrees              Precautions: S/P left hammer tow correction, Toes 2-3   fusion with pinning DOS       Manuals 4/4            PROM to Left ankle, Toes, Caution with Toes 2-3 RR                                                   Neuro Re-Ed             Towel Curls              Towel INV              Ankle TB 4 way                                                                 Ther Ex             Nu step             Gastroc stretch              HR TR              Mini Squat              Step Up              Three way                                        Ther Activity                                       Gait Training             Treadmill walk                           Modalities

## 2024-04-03 NOTE — PROGRESS NOTES
PT Evaluation     Today's date: 2024  Patient name: Kevin Vivar  : 1950  MRN: [id number]  Referring provider: Alonso Stuart DPM  Dx:   Encounter Diagnosis     ICD-10-CM    1. Gait abnormality  R26.9                      Assessment  Assessment details: Kevin Vivar is a 73 y.o. female who presents to outpatient PT with a  Gait abnormality  (primary encounter diagnosis).  No further referral appears necessary at this time based upon examination results. Pt presents with decreased strength, ROM, balance, functional activity tolerance, and pain with movement in her left foot which is  limiting her ability to perform the aforementioned functional activities.  Etiologic factors include repetitive poor body mechanics. Prognosis is good given HEP compliance and PT 2-3/wk.  Please contact me if you have any questions or recommendations.  Thank you for the opportunity to share in  Kevin [facility]ita.     Impairments: abnormal coordination, abnormal muscle firing, abnormal or restricted ROM, activity intolerance, impaired physical strength, lacks appropriate home exercise program, pain with function and safety issue  Functional limitations: Difficulty with ambulation  Symptom irritability: moderateUnderstanding of Dx/Px/POC: good   Prognosis: good    Goals  STG to be achieved in 4 weeks     1. Pt will reduce subjective pain rating by at least 50 percent the help facilitate return to PLOF  2. Pt will improve ROM by at least 10 degrees to help promote improved functional activity tolerance   3. Pt will improve MMT scores by at least 1/2 grade to promote improved functional activity tolerance      1. Pt will be complaint with HEP   2. Pt will improve ROM to WFL, to help facilitate independence with ADL's, IADL's, and functional activities   3. Pt will improve Strength to WFL to help facilitate independence with ADL's, IADL's, and functional activities   4. Pt will have no limitations with ambulation to help  facilitate independence at home and in the community.   5. Pt will have no limitations with stair negotiation to help facilitate independence at home and in the community           Plan  Patient would benefit from: skilled physical therapy  Planned therapy interventions: ADL training, balance, balance/weight bearing training, flexibility, functional ROM exercises, gait training, graded activity, graded exercise, graded motor, home exercise program, joint mobilization, manual therapy, neuromuscular re-education, patient education, postural training, strengthening, stretching, therapeutic activities and therapeutic exercise  Frequency: 2x week  Duration in weeks: 12  Plan of Care beginning date: 4/3/2024  Plan of Care expiration date: 7/3/2024  Treatment plan discussed with: patient, PTA and referring physician        Subjective Evaluation    History of Present Illness  Mechanism of injury: 73 year old female     Presents S/P left foot surgery [facility] on  for Hammertoe correction and Solid fusion of the PIPJ toes 2,3 with pin in place.     Pt reports difficulty with walking. Ever since her surgery she has had difficulty with normalizing her gait pattern. Her goal is to improve her walking mechanics     She Is WBAT in a sneaker this date.    Patient Goals  Patient goals for therapy: increased strength, decreased pain and increased motion  Patient goal: to be able to walk a mile  Pain  No pain reported  Current pain ratin  At best pain ratin  At worst pain ratin  Quality: dull ache and sharp  Relieving factors: relaxation, support and rest  Aggravating factors: standing and walking  Progression: improved        Objective     Active Range of Motion   Left Ankle/Foot   Dorsiflexion (ke): 0 degrees   Plantar flexion: 60 degrees   Inversion: 20 degrees   Eversion: 20 degrees   Great toe flexion: 0 degrees   Great toe extension: 10 degrees   Lesser toes: 0 degrees              Precautions: S/P left hammer tow  correction, Toes 2-3   fusion with pinning DOS 1/16      Manuals 4/4            PROM to Left ankle, Toes, Caution with Toes 2-3 RR                                                   Neuro Re-Ed             Towel Curls              Towel INV              Ankle TB 4 way                                                                 Ther Ex             Nu step             Gastroc stretch              HR TR              Mini Squat              Step Up              Three way                                        Ther Activity                                       Gait Training             Treadmill walk                           Modalities

## 2024-04-05 ENCOUNTER — OFFICE VISIT (OUTPATIENT)
Dept: PHYSICAL THERAPY | Facility: CLINIC | Age: 74
End: 2024-04-05
Payer: MEDICARE

## 2024-04-05 DIAGNOSIS — R26.9 GAIT ABNORMALITY: Primary | ICD-10-CM

## 2024-04-05 PROCEDURE — 97140 MANUAL THERAPY 1/> REGIONS: CPT

## 2024-04-05 PROCEDURE — 97112 NEUROMUSCULAR REEDUCATION: CPT

## 2024-04-05 PROCEDURE — 97110 THERAPEUTIC EXERCISES: CPT

## 2024-04-05 NOTE — PROGRESS NOTES
"Daily Note     Today's date: 2024  Patient name: Kevin Vivar  : 1950  MRN: [id number]  Referring provider: Alonso Stuart DPM  Dx:   Encounter Diagnosis     ICD-10-CM    1. Gait abnormality  R26.9                      Subjective: Pt reports she walked 7/10 of mile outdoors yesterday without difficulty, no significant increased swelling. Pt states she had pins removed toes L foot 6 weeks post op.         Objective: See treatment diary below      Assessment: Tolerated treatment well. Initiated exercises as outlined in POC. Issued handouts and reviewed with patient for HEP. Patient demonstrated fatigue post treatment, exhibited good technique with therapeutic exercises, and would benefit from continued PT      Plan: Continue per plan of care.      Precautions: S/P left hammer tow correction, Toes 2-3   fusion with pinning DOS       Manuals 4/ 4/5           PROM to Left ankle, Toes, Caution with Toes 2-3 RR [facility]                                                  Neuro Re-Ed             Towel Curls   5\" x1min           Towel INV   1 min           Ankle TB 4 way  RTB  X20 ea                                                               Ther Ex             Nu step  L3 x10 min           Gastroc stretch   Incline board  30\" x3           HR TR   Seated  x20           Mini Squat              Step Up   2x10           Three way                                        Ther Activity                                       Gait Training             Treadmill walk   1.5-1.8 mph x5 min                        Modalities                                          Access Code: [email or web address]  URL: [email or web address]  Date: 2024  Prepared by: Jamaica Wray    Exercises  - Seated Ankle Dorsiflexion with Anchored Resistance  - 1 x daily - 3 x weekly - 2 sets - 10 reps  - Seated Ankle Eversion with Resistance  - 1 x daily - 3 x weekly - 2 sets - 10 reps  - Ankle Inversion with Anchored Resistance at " "Table  - 1 x daily - 3 x weekly - 2 sets - 10 reps  - Seated Ankle Plantarflexion with Resistance  - 1 x daily - 3 x weekly - 2 sets - 10 reps  - Gastroc Stretch on Wall  - 1 x daily - 7 x weekly - 1 sets - 3 reps - 30\" hold  "

## 2024-04-08 ENCOUNTER — OFFICE VISIT (OUTPATIENT)
Dept: PHYSICAL THERAPY | Facility: CLINIC | Age: 74
End: 2024-04-08
Payer: MEDICARE

## 2024-04-08 DIAGNOSIS — R26.9 GAIT ABNORMALITY: Primary | ICD-10-CM

## 2024-04-08 PROCEDURE — 97110 THERAPEUTIC EXERCISES: CPT

## 2024-04-08 PROCEDURE — 97140 MANUAL THERAPY 1/> REGIONS: CPT

## 2024-04-08 PROCEDURE — 97112 NEUROMUSCULAR REEDUCATION: CPT

## 2024-04-08 NOTE — PROGRESS NOTES
"Daily Note     Today's date: 2024  Patient name: Kevin Vivar  : 1950  MRN: [id number]  Referring provider: Alonso Stuart DPM  Dx:   Encounter Diagnosis     ICD-10-CM    1. Gait abnormality  R26.9           Start Time: 1100  Stop Time: 1200  Total time in clinic (min): 60 minutes    Subjective: Pt reports her L fore foot is sore today, states she walked outdoors both days over the weekend.       Objective: See treatment diary below      Assessment: Tolerated treatment well. Patient demonstrated fatigue post treatment, exhibited good technique with therapeutic exercises, and would benefit from continued PT, progressing as tolerated.      Plan: Continue per plan of care.      Precautions: S/P left hammer tow correction, Toes 2-3   fusion with pinning DOS       Manuals           PROM to Left ankle, Toes, Caution with Toes 2-3 RR MJC MJC          STM incisions 2-3 toes   MJC                                    Neuro Re-Ed             Towel Curls   5\" x1min 5\"x1 min          Towel INV   1 min 2 min          Ankle TB 4 way  RTB  X20 ea RTB  X20 ea                                                              Ther Ex             Nu step  L3 x10 min L3 x10 min          Gastroc stretch   Incline board  30\" x3 Incline  Board   30\" x3          HR TR   Seated  x20 Seated  x20          Mini Squat    NV          Step Up   2x10 \"B\" 2x10          Three way    NV                                    Ther Activity                                       Gait Training             Treadmill walk   1.5-1.8 mph x5 min 1.8 mph  X5 min                       Modalities                                              "

## 2024-04-10 ENCOUNTER — OFFICE VISIT (OUTPATIENT)
Dept: PHYSICAL THERAPY | Facility: CLINIC | Age: 74
End: 2024-04-10
Payer: MEDICARE

## 2024-04-10 DIAGNOSIS — R26.9 GAIT ABNORMALITY: Primary | ICD-10-CM

## 2024-04-10 PROCEDURE — 97112 NEUROMUSCULAR REEDUCATION: CPT

## 2024-04-10 PROCEDURE — 97110 THERAPEUTIC EXERCISES: CPT

## 2024-04-10 PROCEDURE — 97140 MANUAL THERAPY 1/> REGIONS: CPT

## 2024-04-10 NOTE — PROGRESS NOTES
"Daily Note     Today's date: 4/10/2024  Patient name: Kevin Vivar  : 1950  MRN: [id number]  Referring provider: Alonso Stuart DPM  Dx:   Encounter Diagnosis     ICD-10-CM    1. Gait abnormality  R26.9                      Subjective: Pt continues to c/o L forefoot pain. Denies increased symptoms following last treatment. States she is scheduled to be fitted for new orthotics.       Objective: See treatment diary below      Assessment: Tolerated treatment well. Patient demonstrated fatigue post treatment, exhibited good technique with therapeutic exercises, and would benefit from continued PT      Plan: Continue per plan of care.      Precautions: S/P left hammer tow correction, Toes 2-3   fusion with pinning DOS       Manuals 4/4 4/5 4/8 4/10         PROM to Left ankle, Toes, Caution with Toes 2-3 RR MJC MJC MJC         STM incisions 2-3 toes   MJC MJC                                   Neuro Re-Ed             Towel Curls   5\" x1min 5\"x1 min 5\" x1         Towel INV   1 min 2 min 2 min         Ankle TB 4 way  RTB  X20 ea RTB  X20 ea GTB  X20 ea                                                             Ther Ex             Nu step  L3 x10 min L3 x10 min L4 x10 min         Gastroc stretch   Incline board  30\" x3 Incline  Board   30\" x3 Lunge  30\" x3         HR TR   Seated  x20 Seated  x20 Seated x20         Mini Squat    NV NV         Step Up   2x10 \"B\" 2x10 \"B\"  2x10         Three way    NV HEP                                   Ther Activity                                       Gait Training             Treadmill walk   1.5-1.8 mph x5 min 1.8 mph  X5 min NV                      Modalities                                                "

## 2024-04-15 ENCOUNTER — OFFICE VISIT (OUTPATIENT)
Dept: PHYSICAL THERAPY | Facility: CLINIC | Age: 74
End: 2024-04-15
Payer: MEDICARE

## 2024-04-15 DIAGNOSIS — R26.9 GAIT ABNORMALITY: Primary | ICD-10-CM

## 2024-04-15 PROCEDURE — 97112 NEUROMUSCULAR REEDUCATION: CPT

## 2024-04-15 PROCEDURE — 97110 THERAPEUTIC EXERCISES: CPT

## 2024-04-15 PROCEDURE — 97140 MANUAL THERAPY 1/> REGIONS: CPT

## 2024-04-15 NOTE — PROGRESS NOTES
"Daily Note     Today's date: 4/15/2024  Patient name: Kevin Vivar  : 1950  MRN: [id number]  Referring provider: Alonso Stuart DPM  Dx:   Encounter Diagnosis     ICD-10-CM    1. Gait abnormality  R26.9                      Subjective: Pt reports she had L foot pain after shopping and being on feet a lot and cleaning, c/o difficulty walking afterward. States she removed orthotics from shoes with noticed improvement. Reports she is scheduled to be fitted for new orthotics.       Objective: See treatment diary below      Assessment: Tolerated treatment well. Minimal edema noted L forefoot. Patient demonstrated fatigue post treatment, exhibited good technique with therapeutic exercises, and would benefit from continued PT. Progress to [facility] next visit for balance and proprioception training as per DPT.       Plan: Continue per plan of care.      Precautions: S/P left hammer tow correction, Toes 2-3   fusion with pinning DOS       Manuals 4/4 4/5 4/8 4/10 4/15        PROM to Left ankle, Toes, Caution with Toes 2-3 RR MJC MJC MJC MJC        STM incisions 2-3 toes   MJC MJC MJC                                  Neuro Re-Ed             Towel Curls   5\" x1min 5\"x1 min 5\" x1 5\" x2 min        Towel INV   1 min 2 min 2 min 2 min        Ankle TB 4 way  RTB  X20 ea RTB  X20 ea GTB  X20 ea HEP        Biodex             Wt shift             LOS                          Ther Ex             Nu step  L3 x10 min L3 x10 min L4 x10 min L5x10 min        Gastroc stretch   Incline board  30\" x3 Incline  Board   30\" x3 Lunge  30\" x3 Incline board  30\" x3        HR TR   Seated  x20 Seated  x20 Seated x20 Seated   x20        Mini Squat    NV NV         Step Up   2x10 \"B\" 2x10 \"B\"  2x10 \"B\" 2x10         Three way    NV HEP                                   Ther Activity                                       Gait Training             Treadmill walk   1.5-1.8 mph x5 min 1.8 mph  X5 min NV 1.5 mph x  6 min                   "   Modalities

## 2024-04-17 ENCOUNTER — OFFICE VISIT (OUTPATIENT)
Dept: PHYSICAL THERAPY | Facility: CLINIC | Age: 74
End: 2024-04-17
Payer: MEDICARE

## 2024-04-17 DIAGNOSIS — R26.9 GAIT ABNORMALITY: Primary | ICD-10-CM

## 2024-04-17 PROCEDURE — 97112 NEUROMUSCULAR REEDUCATION: CPT

## 2024-04-17 PROCEDURE — 97110 THERAPEUTIC EXERCISES: CPT

## 2024-04-17 PROCEDURE — 97140 MANUAL THERAPY 1/> REGIONS: CPT

## 2024-04-17 NOTE — PROGRESS NOTES
"Daily Note     Today's date: 2024  Patient name: Kevin Vivar  : 1950  MRN: [id number]  Referring provider: Alonso Stuart DPM  Dx:   Encounter Diagnosis     ICD-10-CM    1. Gait abnormality  R26.9                      Subjective: Pt has been compliant with her HEP. She is doing self STM to her scar tissue. She continues to report fatigue and discomfort during ambulation.      Objective: See treatment diary below      Assessment: Initiated multiple exercises in the [facility] to gain stability if the L ankle and foot. Tolerated treatment well. Patient demonstrated fatigue post treatment, exhibited good technique with therapeutic exercises, and would benefit from continued PT. No adverse affect to STM post Tx.       Plan: Continue per plan of care.  Progress treatment as tolerated.       Precautions: S/P left hammer tow correction, Toes 2-3   fusion with pinning DOS       Manuals 4/4 4/5 4/8 4/10 4/15 4/17   PROM to Left ankle, Toes, Caution with Toes 2-3 RR MJC MJC MJC MJC TM   STM incisions 2-3 toes   MJC MJC MJC TM                     Neuro Re-Ed         Towel Curls   5\" x1min 5\"x1 min 5\" x1 5\" x2 min 2 min   Towel INV   1 min 2 min 2 min 2 min 2 min   Ankle TB 4 way  RTB  X20 ea RTB  X20 ea GTB  X20 ea HEP    Biodex:         Wt shift      Static, med dif, x2   LOS         Maze      Static, med dif, x2   Ther Ex         Nu step  L3 x10 min L3 x10 min L4 x10 min L5x10 min L5x10 min   Gastroc stretch   Incline board  30\" x3 Incline  Board   30\" x3 Lunge  30\" x3 Incline board  30\" x3 Incline board  30\" x4   HR TR   Seated  x20 Seated  x20 Seated x20 Seated   x20 Seated   x20   Mini Squat    NV NV     Step Up   2x10 \"B\" 2x10 \"B\"  2x10 \"B\" 2x10  \"B\" 2x10    Three way    NV HEP                       Ther Activity                           Gait Training         Treadmill walk   1.5-1.8 mph x5 min 1.8 mph  X5 min NV 1.5 mph x  6 min 1.5 mph x  4 min    2.2 mph x2 min            Modalities                "

## 2024-04-22 ENCOUNTER — OFFICE VISIT (OUTPATIENT)
Dept: PHYSICAL THERAPY | Facility: CLINIC | Age: 74
End: 2024-04-22
Payer: MEDICARE

## 2024-04-22 DIAGNOSIS — R26.9 GAIT ABNORMALITY: Primary | ICD-10-CM

## 2024-04-22 PROCEDURE — 97112 NEUROMUSCULAR REEDUCATION: CPT

## 2024-04-22 PROCEDURE — 97110 THERAPEUTIC EXERCISES: CPT

## 2024-04-22 NOTE — PROGRESS NOTES
"Daily Note     Today's date: 2024  Patient name: Kevin Vivar  : 1950  MRN: [id number]  Referring provider: Alonso Stuart DPM  Dx:   Encounter Diagnosis     ICD-10-CM    1. Gait abnormality  R26.9                      Subjective: Pt reports she walked over the weekend, still some soreness L foot after walking.      Objective: See treatment diary below      Assessment: Tolerated treatment well. Gradual progression with balance activities. Patient demonstrated fatigue post treatment, exhibited good technique with therapeutic exercises, and would benefit from continued PT, progressing as tolerated.       Plan: Continue per plan of care.      Precautions: S/P left hammer tow correction, Toes 2-3   fusion with pinning DOS       Manuals 4/8 4/10 4/15 4/17 4/22   PROM to Left ankle, Toes, Caution with Toes 2-3 MJC MJC MJC TM MJC   STM incisions 2-3 toes MJC MJC MJC TM Pt defers                   Neuro Re-Ed        Towel Curls  5\"x1 min 5\" x1 5\" x2 min 2 min 2 min   Towel INV  2 min 2 min 2 min 2 min 2min   Ankle TB 4 way RTB  X20 ea GTB  X20 ea HEP     Biodex:        Wt shift    Static, med dif, x2 Static   AP/ML/Diag  X20 ea\   LOS     Static med  x2   Maze    Static, med dif, x2 Static med dif x2   Tandem stand on foam       NV           Ther Ex        Nu step L3 x10 min L4 x10 min L5x10 min L5x10 min L5 x10 min   Gastroc stretch  Incline  Board   30\" x3 Lunge  30\" x3 Incline board  30\" x3 Incline board  30\" x4 Incline board  30\" x4   HR TR  Seated  x20 Seated x20 Seated   x20 Seated   x20    Mini Squat  NV NV      Step Up  \"B\" 2x10 \"B\"  2x10 \"B\" 2x10  \"B\" 2x10  \"B\" 2x10   Three way  NV HEP                      Ther Activity                        Gait Training        Treadmill walk  1.8 mph  X5 min NV 1.5 mph x  6 min 1.5 mph x  4 min    2.2 mph x2 min            Modalities                                       "

## 2024-04-24 ENCOUNTER — OFFICE VISIT (OUTPATIENT)
Dept: PHYSICAL THERAPY | Facility: CLINIC | Age: 74
End: 2024-04-24
Payer: MEDICARE

## 2024-04-24 DIAGNOSIS — R26.9 GAIT ABNORMALITY: Primary | ICD-10-CM

## 2024-04-24 PROCEDURE — 97530 THERAPEUTIC ACTIVITIES: CPT

## 2024-04-24 PROCEDURE — 97112 NEUROMUSCULAR REEDUCATION: CPT

## 2024-04-24 PROCEDURE — 97110 THERAPEUTIC EXERCISES: CPT

## 2024-04-24 NOTE — PROGRESS NOTES
"Daily Note     Today's date: 2024  Patient name: Kevin Vivar  : 1950  MRN: [id number]  Referring provider: Alonso tSuart DPM  Dx:   Encounter Diagnosis     ICD-10-CM    1. Gait abnormality  R26.9                      Subjective: Pt denies any adverse effects following last treatment.       Objective: See treatment diary below      Assessment: Tolerated treatment well. Gradual progression with balance activities as documented. Patient demonstrated fatigue post treatment, exhibited good technique with therapeutic exercises, and would benefit from continued PT      Plan: Continue per plan of care.      Precautions: S/P left hammer tow correction, Toes 2-3   fusion with pinning DOS       Manuals 4/10 4/15 4/17 4/22 4/24   PROM to Left ankle, Toes, Caution with Toes 2-3 MJC MJC TM MJC MJC  L ankle ROM   STM incisions 2-3 toes MJC MJC TM Pt defers                    Neuro Re-Ed        Towel Curls  5\" x1 5\" x2 min 2 min 2 min 2 min   Towel INV  2 min 2 min 2 min 2min 2 min   Ankle TB 4 way GTB  X20 ea HEP      Biodex:        Wt shift   Static, med dif, x2 Static   AP/ML/Diag  X20 ea\ L12  AP/ML/Diag  X20 ea\   LOS    Static med  x2 L12  Med diff x2   Maze   Static, med dif, x2 Static med dif x2 L12  Med diff x2   Tandem stand on foam      NV 15\" x3 ea            Ther Ex        Nu step L4 x10 min L5x10 min L5x10 min L5 x10 min L6 x10   Gastroc stretch  Lunge  30\" x3 Incline board  30\" x3 Incline board  30\" x4 Incline board  30\" x4 Incline board  30\" x4   HR TR  Seated x20 Seated   x20 Seated   x20     Mini Squat  NV       Step Up  \"B\"  2x10 \"B\" 2x10  \"B\" 2x10  \"B\" 2x10 NV   Three way  HEP                       Ther Activity                        Gait Training        Treadmill walk  NV 1.5 mph x  6 min 1.5 mph x  4 min    2.2 mph x2 min 2.3 mph  X10 min 2.3 mph x10 min           Modalities                                         "

## 2024-04-29 ENCOUNTER — OFFICE VISIT (OUTPATIENT)
Dept: PHYSICAL THERAPY | Facility: CLINIC | Age: 74
End: 2024-04-29
Payer: MEDICARE

## 2024-04-29 DIAGNOSIS — R26.9 GAIT ABNORMALITY: Primary | ICD-10-CM

## 2024-04-29 PROCEDURE — 97140 MANUAL THERAPY 1/> REGIONS: CPT | Performed by: PHYSICAL THERAPIST

## 2024-04-29 PROCEDURE — 97110 THERAPEUTIC EXERCISES: CPT | Performed by: PHYSICAL THERAPIST

## 2024-04-29 NOTE — PROGRESS NOTES
"Daily Note     Today's date: 2024  Patient name: Kevin Vivar  : 1950  MRN: [id number]  Referring provider: Alonso Stuart DPM  Dx:   Encounter Diagnosis     ICD-10-CM    1. Gait abnormality  R26.9                      Subjective: Pt is happy with PT progress. Wishes to be DC this date       Objective: See treatment diary below      Assessment: Tolerated treatment well. Patient exhibited good technique with therapeutic exercises      Plan: Continue per plan of care.      Precautions: S/P left hammer tow correction, Toes 2-3   fusion with pinning DOS       Manuals 4/29 4/15 4/17 4/22 4/24   PROM to Left ankle, Toes, Caution with Toes 2-3 RR MJC TM MJC MJC  L ankle ROM   STM incisions 2-3 toes RR MJC TM Pt defers                    Neuro Re-Ed        Towel Curls  5\" x2 min  5\" x2 min 2 min 2 min 2 min   Towel INV  2 min 2 min 2 min 2min 2 min   Ankle TB 4 way  HEP      Biodex:        Wt shift   Static, med dif, x2 Static   AP/ML/Diag  X20 ea\ L12  AP/ML/Diag  X20 ea\   LOS    Static med  x2 L12  Med diff x2   Maze   Static, med dif, x2 Static med dif x2 L12  Med diff x2   Tandem stand on foam   15''x3   NV 15\" x3 ea            Ther Ex        Nu step L4 x10 min L5x10 min L5x10 min L5 x10 min L6 x10   Gastroc stretch  Lunge  30\" x3 Incline board  30\" x3 Incline board  30\" x4 Incline board  30\" x4 Incline board  30\" x4   HR TR  Seated x20 Seated   x20 Seated   x20     Mini Squat         Step Up  \"B\"  2x10 \"B\" 2x10  \"B\" 2x10  \"B\" 2x10 NV   Three way                         Ther Activity                        Gait Training        Treadmill walk  NV 1.5 mph x  6 min 1.5 mph x  4 min    2.2 mph x2 min 2.3 mph  X10 min 2.3 mph x10 min           Modalities                                           "

## 2024-07-02 ENCOUNTER — APPOINTMENT (OUTPATIENT)
Dept: LAB | Facility: HOSPITAL | Age: 74
End: 2024-07-02
Payer: MEDICARE

## 2024-07-02 DIAGNOSIS — I10 HYPERTENSION, UNSPECIFIED TYPE: ICD-10-CM

## 2024-07-02 LAB
25(OH)D3 SERPL-MCNC: 48.8 NG/ML (ref 30–100)
ALBUMIN SERPL BCG-MCNC: 4.3 G/DL (ref 3.5–5)
ALP SERPL-CCNC: 71 U/L (ref 34–104)
ALT SERPL W P-5'-P-CCNC: 19 U/L (ref 7–52)
ANION GAP SERPL CALCULATED.3IONS-SCNC: 6 MMOL/L (ref 4–13)
AST SERPL W P-5'-P-CCNC: 19 U/L (ref 13–39)
BILIRUB SERPL-MCNC: 0.56 MG/DL (ref 0.2–1)
BUN SERPL-MCNC: 19 MG/DL (ref 5–25)
CALCIUM SERPL-MCNC: 9.4 MG/DL (ref 8.4–10.2)
CHLORIDE SERPL-SCNC: 105 MMOL/L (ref 96–108)
CHOLEST SERPL-MCNC: 153 MG/DL
CO2 SERPL-SCNC: 28 MMOL/L (ref 21–32)
CREAT SERPL-MCNC: 0.83 MG/DL (ref 0.6–1.3)
ERYTHROCYTE [DISTWIDTH] IN BLOOD BY AUTOMATED COUNT: 12.5 % (ref 11.6–15.1)
EST. AVERAGE GLUCOSE BLD GHB EST-MCNC: 128 MG/DL
GFR SERPL CREATININE-BSD FRML MDRD: 69 ML/MIN/1.73SQ M
GLUCOSE P FAST SERPL-MCNC: 111 MG/DL (ref 65–99)
HBA1C MFR BLD: 6.1 %
HCT VFR BLD AUTO: 40.5 % (ref 34.8–46.1)
HDLC SERPL-MCNC: 74 MG/DL
HGB BLD-MCNC: 13.2 G/DL (ref 11.5–15.4)
LDLC SERPL CALC-MCNC: 58 MG/DL (ref 0–100)
MCH RBC QN AUTO: 30.6 PG (ref 26.8–34.3)
MCHC RBC AUTO-ENTMCNC: 32.6 G/DL (ref 31.4–37.4)
MCV RBC AUTO: 94 FL (ref 82–98)
NONHDLC SERPL-MCNC: 79 MG/DL
PLATELET # BLD AUTO: 184 THOUSANDS/UL (ref 149–390)
PMV BLD AUTO: 11.5 FL (ref 8.9–12.7)
POTASSIUM SERPL-SCNC: 4.8 MMOL/L (ref 3.5–5.3)
PROT SERPL-MCNC: 7.1 G/DL (ref 6.4–8.4)
RBC # BLD AUTO: 4.32 MILLION/UL (ref 3.81–5.12)
SODIUM SERPL-SCNC: 139 MMOL/L (ref 135–147)
T4 FREE SERPL-MCNC: 1.11 NG/DL (ref 0.61–1.12)
TRIGL SERPL-MCNC: 103 MG/DL
TSH SERPL DL<=0.05 MIU/L-ACNC: 5.1 UIU/ML (ref 0.45–4.5)
WBC # BLD AUTO: 4.1 THOUSAND/UL (ref 4.31–10.16)

## 2024-07-02 PROCEDURE — 83036 HEMOGLOBIN GLYCOSYLATED A1C: CPT

## 2024-07-02 PROCEDURE — 85027 COMPLETE CBC AUTOMATED: CPT

## 2024-07-02 PROCEDURE — 80053 COMPREHEN METABOLIC PANEL: CPT

## 2024-07-02 PROCEDURE — 84443 ASSAY THYROID STIM HORMONE: CPT

## 2024-07-02 PROCEDURE — 84439 ASSAY OF FREE THYROXINE: CPT

## 2024-07-02 PROCEDURE — 82306 VITAMIN D 25 HYDROXY: CPT

## 2024-07-02 PROCEDURE — 80061 LIPID PANEL: CPT

## 2024-07-02 PROCEDURE — 36415 COLL VENOUS BLD VENIPUNCTURE: CPT

## 2024-11-19 ENCOUNTER — APPOINTMENT (OUTPATIENT)
Dept: LAB | Facility: HOSPITAL | Age: 74
End: 2024-11-19
Payer: MEDICARE

## 2024-11-19 DIAGNOSIS — E03.9 HYPOTHYROIDISM, UNSPECIFIED TYPE: ICD-10-CM

## 2024-11-19 LAB
T4 FREE SERPL-MCNC: 1.18 NG/DL (ref 0.61–1.12)
TSH SERPL DL<=0.05 MIU/L-ACNC: 1.31 UIU/ML (ref 0.45–4.5)

## 2024-11-19 PROCEDURE — 84439 ASSAY OF FREE THYROXINE: CPT

## 2024-11-19 PROCEDURE — 84443 ASSAY THYROID STIM HORMONE: CPT

## 2024-11-19 PROCEDURE — 36415 COLL VENOUS BLD VENIPUNCTURE: CPT

## 2025-01-07 ENCOUNTER — OFFICE VISIT (OUTPATIENT)
Age: 75
End: 2025-01-07
Payer: MEDICARE

## 2025-01-07 VITALS
HEART RATE: 82 BPM | BODY MASS INDEX: 36.26 KG/M2 | HEIGHT: 67 IN | WEIGHT: 231 LBS | TEMPERATURE: 97.9 F | OXYGEN SATURATION: 99 %

## 2025-01-07 DIAGNOSIS — K21.9 GASTROESOPHAGEAL REFLUX DISEASE, UNSPECIFIED WHETHER ESOPHAGITIS PRESENT: Primary | ICD-10-CM

## 2025-01-07 DIAGNOSIS — E66.01 MORBID OBESITY (HCC): ICD-10-CM

## 2025-01-07 PROCEDURE — G2211 COMPLEX E/M VISIT ADD ON: HCPCS | Performed by: STUDENT IN AN ORGANIZED HEALTH CARE EDUCATION/TRAINING PROGRAM

## 2025-01-07 PROCEDURE — 99204 OFFICE O/P NEW MOD 45 MIN: CPT | Performed by: STUDENT IN AN ORGANIZED HEALTH CARE EDUCATION/TRAINING PROGRAM

## 2025-01-07 RX ORDER — SODIUM CHLORIDE, SODIUM LACTATE, POTASSIUM CHLORIDE, CALCIUM CHLORIDE 600; 310; 30; 20 MG/100ML; MG/100ML; MG/100ML; MG/100ML
125 INJECTION, SOLUTION INTRAVENOUS CONTINUOUS
OUTPATIENT
Start: 2025-01-07

## 2025-01-07 NOTE — PATIENT INSTRUCTIONS
We will schedule you for upper endoscopy (EGD)  Continue omeprazole 20 mg 2 times daily  We will add Pepcid 20 mg daily at bedtime  You can get this over-the-counter

## 2025-01-08 NOTE — ASSESSMENT & PLAN NOTE
Longstanding history of reflux.  Does seem to have exacerbation of symptoms at night.  Omeprazole does help which she is currently on 20 mg 2 times daily.  Denies any dysphagia or odynophagia.  May have underlying hiatal hernia which may be contributing.  Other potential etiologies include peptic ulcer disease versus H. pylori infection versus functional dyspepsia.    Recommend EGD for evaluation and for Chatterjee's screening given chronicity of symptoms  She may continue omeprazole 20 mg 2 times daily for now  She may add Pepcid 20 mg daily at bedtime to help with nighttime breakthrough symptoms    I obtained informed consent from the patient.  The risks/benefits/alternatives of the procedure were discussed with the patient.  Risks included, but not limited to, infection, bleeding, perforation, injury to organs in the abdomen, missed lesion and incomplete procedure were discussed.  Patient was agreeable and electronic signature was obtained.     Orders:    EGD; Future

## 2025-01-08 NOTE — PROGRESS NOTES
Name: Kevin Vivar      : 1950      MRN: [id number]  Encounter Provider: Pankaj Arteaga DO  Encounter Date: 2025   Encounter department: [facility]  :  Assessment & Plan  Gastroesophageal reflux disease, unspecified whether esophagitis present  Longstanding history of reflux.  Does seem to have exacerbation of symptoms at night.  Omeprazole does help which she is currently on 20 mg 2 times daily.  Denies any dysphagia or odynophagia.  May have underlying hiatal hernia which may be contributing.  Other potential etiologies include peptic ulcer disease versus H. pylori infection versus functional dyspepsia.    Recommend EGD for evaluation and for Chatterjee's screening given chronicity of symptoms  She may continue omeprazole 20 mg 2 times daily for now  She may add Pepcid 20 mg daily at bedtime to help with nighttime breakthrough symptoms    I obtained informed consent from the patient.  The risks/benefits/alternatives of the procedure were discussed with the patient.  Risks included, but not limited to, infection, bleeding, perforation, injury to organs in the abdomen, missed lesion and incomplete procedure were discussed.  Patient was agreeable and electronic signature was obtained.     Orders:    EGD; Future    Morbid obesity (HCC)  BMI 36.18 kg/m².  Comorbidities include hypertension, dyslipidemia.    Encouraged dietary changes and efforts to help with weight loss  Will continue to monitor weight trends             History of Present Illness   74-year-old female presents to GI office for evaluation of chronic reflux.      Kevin Vivar is a 74 y.o. female who presents to GI office for evaluation of chronic reflux.  Patient is new to me.  Patient has been referred by PCP.  She reports a longstanding history of reflux symptoms for which she has been taking omeprazole.  She has had reflux symptoms for many years.  No prior EGD.  She denies any dysphagia or  odynophagia.  She takes omeprazole 20 mg 2 times daily which does help with her symptoms.  She does admit to frequent breakthrough symptoms in the evening at bedtime.  Does not appear to be eating close to bedtime.  Weight has remained stable.  Reports having undergone colonoscopy at age 69.  She was told that this was normal and that she did not need to have any further colonoscopies as she would be 79 years old at the next colonoscopy.  She denies any alarming lower GI symptoms including change in bowel habits, hematochezia, melena.    History obtained from: patient    Review of Systems   Constitutional:  Negative for appetite change.   HENT:  Negative for trouble swallowing.    Respiratory:  Negative for shortness of breath.    Cardiovascular:  Negative for chest pain and palpitations.   Gastrointestinal:  Negative for abdominal distention, abdominal pain, blood in stool, constipation, diarrhea, nausea, rectal pain and vomiting.        +reflux (nighttime breakthrough)   All other systems reviewed and are negative.    Medical History Reviewed by provider this encounter:     .  Current Outpatient Medications on File Prior to Visit   Medication Sig Dispense Refill    acetaminophen (TYLENOL) 650 mg CR tablet Take 650 mg by mouth Take four pills daliy (2 in the am and 2 in the pm) for arthritic pain.      albuterol (PROVENTIL HFA,VENTOLIN HFA) 90 mcg/act inhaler Inhale 2 puffs every 6 (six) hours as needed for wheezing      aspirin 81 mg chewable tablet Chew 81 mg daily      atorvastatin (LIPITOR) 80 mg tablet Take 80 mg by mouth daily      betamethasone, augmented, (DIPROLENE) 0.05 % lotion Apply topically if needed      Biotin 2500 MCG CAPS Take 2,500 mcg by mouth in the morning      cholecalciferol (VITAMIN D) 400 units/1 mL       gabapentin (NEURONTIN) 100 mg capsule Take 100 mg by mouth 3 (three) times a day      levothyroxine 175 mcg tablet Take 150 mcg by mouth daily 150-4 days  175-3 days a week       "loratadine (CLARITIN REDITABS) 10 MG dissolvable tablet Take 10 mg by mouth daily      losartan (COZAAR) 50 mg tablet Take 100 mg by mouth in the morning      metoprolol succinate (TOPROL-XL) 25 mg 24 hr tablet Take 25 mg by mouth daily      montelukast (SINGULAIR) 10 mg tablet Take 10 mg by mouth daily at bedtime      Multiple Vitamin (MULTI-VITAMIN DAILY PO) Take by mouth      omeprazole (PriLOSEC) 20 mg delayed release capsule Take 20 mg by mouth 2 (two) times a day      spironolactone (ALDACTONE) 25 mg tablet Take 25 mg by mouth daily      budesonide-formoterol (Symbicort) 160-4.5 mcg/act inhaler Inhale 2 puffs 2 (two) times a day (Patient not taking: Reported on 2025)       No current facility-administered medications on file prior to visit.      Social History     Tobacco Use    Smoking status: Former     Current packs/day: 0.00     Average packs/day: 0.5 packs/day for 8.0 years (4.0 ttl pk-yrs)     Types: Cigarettes     Quit date:      Years since quittin.0     Passive exposure: Never    Smokeless tobacco: Never   Vaping Use    Vaping status: Never Used   Substance and Sexual Activity    Alcohol use: Yes     Alcohol/week: 4.0 - 7.0 standard drinks of alcohol     Types: 4 - 7 Glasses of wine per week     Comment: daily    Drug use: No    Sexual activity: Not Currently     Partners: Male     Birth control/protection: Surgical, Female Sterilization     Comment: lifetime partner:1;  for 52 years        Objective   Pulse 82   Temp 97.9 °F (36.6 °C) (Temporal)   Ht 5' 7\" (1.702 m)   Wt 105 kg (231 lb)   SpO2 99%   BMI 36.18 kg/m²      Physical Exam  Vitals reviewed.   Constitutional:       Appearance: Normal appearance.   HENT:      Head: Normocephalic and atraumatic.      Nose: Nose normal.   Eyes:      Extraocular Movements: Extraocular movements intact.   Cardiovascular:      Rate and Rhythm: Normal rate and regular rhythm.   Pulmonary:      Effort: Pulmonary effort is normal. No " respiratory distress.   Abdominal:      General: Abdomen is flat. Bowel sounds are normal. There is no distension.      Palpations: Abdomen is soft. There is no mass.      Tenderness: There is no abdominal tenderness. There is no guarding.   Neurological:      General: No focal deficit present.      Mental Status: She is alert.   Psychiatric:         Mood and Affect: Mood normal.         Behavior: Behavior normal.         Administrative Statements   I have spent a total time of 20 minutes in caring for this patient on the day of the visit/encounter including Diagnostic results, Prognosis, Risks and benefits of tx options, Instructions for management, Patient and family education, Importance of tx compliance, Risk factor reductions, Impressions, Documenting in the medical record, Reviewing / ordering tests, medicine, procedures  , and Obtaining or reviewing history  . Topics discussed with the patient / family include symptom assessment and management, medication review, and medication adjustment.

## 2025-01-08 NOTE — ASSESSMENT & PLAN NOTE
BMI 36.18 kg/m².  Comorbidities include hypertension, dyslipidemia.    Encouraged dietary changes and efforts to help with weight loss  Will continue to monitor weight trends

## 2025-02-03 ENCOUNTER — HOSPITAL ENCOUNTER (OUTPATIENT)
Dept: GASTROENTEROLOGY | Facility: HOSPITAL | Age: 75
Setting detail: OUTPATIENT SURGERY
Discharge: HOME | End: 2025-02-03
Attending: STUDENT IN AN ORGANIZED HEALTH CARE EDUCATION/TRAINING PROGRAM
Payer: MEDICARE

## 2025-02-03 ENCOUNTER — ANESTHESIA EVENT (OUTPATIENT)
Dept: GASTROENTEROLOGY | Facility: HOSPITAL | Age: 75
End: 2025-02-03
Payer: MEDICARE

## 2025-02-03 ENCOUNTER — ANESTHESIA (OUTPATIENT)
Dept: GASTROENTEROLOGY | Facility: HOSPITAL | Age: 75
End: 2025-02-03
Payer: MEDICARE

## 2025-02-03 VITALS
WEIGHT: 221 LBS | HEIGHT: 67 IN | BODY MASS INDEX: 34.69 KG/M2 | TEMPERATURE: 97.8 F | SYSTOLIC BLOOD PRESSURE: 134 MMHG | OXYGEN SATURATION: 98 % | HEART RATE: 72 BPM | RESPIRATION RATE: 18 BRPM | DIASTOLIC BLOOD PRESSURE: 68 MMHG

## 2025-02-03 DIAGNOSIS — K21.9 GASTROESOPHAGEAL REFLUX DISEASE, UNSPECIFIED WHETHER ESOPHAGITIS PRESENT: ICD-10-CM

## 2025-02-03 PROCEDURE — 43239 EGD BIOPSY SINGLE/MULTIPLE: CPT | Performed by: STUDENT IN AN ORGANIZED HEALTH CARE EDUCATION/TRAINING PROGRAM

## 2025-02-03 PROCEDURE — 88305 TISSUE EXAM BY PATHOLOGIST: CPT | Performed by: SPECIALIST

## 2025-02-03 RX ORDER — PROPOFOL 10 MG/ML
INJECTION, EMULSION INTRAVENOUS AS NEEDED
Status: DISCONTINUED | OUTPATIENT
Start: 2025-02-03 | End: 2025-02-03

## 2025-02-03 RX ORDER — SODIUM CHLORIDE, SODIUM LACTATE, POTASSIUM CHLORIDE, CALCIUM CHLORIDE 600; 310; 30; 20 MG/100ML; MG/100ML; MG/100ML; MG/100ML
125 INJECTION, SOLUTION INTRAVENOUS CONTINUOUS
Status: DISCONTINUED | OUTPATIENT
Start: 2025-02-03 | End: 2025-02-07 | Stop reason: HOSPADM

## 2025-02-03 RX ORDER — LIDOCAINE HYDROCHLORIDE 20 MG/ML
INJECTION, SOLUTION EPIDURAL; INFILTRATION; INTRACAUDAL; PERINEURAL AS NEEDED
Status: DISCONTINUED | OUTPATIENT
Start: 2025-02-03 | End: 2025-02-03

## 2025-02-03 RX ADMIN — PROPOFOL 100 MG: 10 INJECTION, EMULSION INTRAVENOUS at 09:03

## 2025-02-03 RX ADMIN — LIDOCAINE HYDROCHLORIDE 100 MG: 20 INJECTION, SOLUTION EPIDURAL; INFILTRATION; INTRACAUDAL at 08:59

## 2025-02-03 RX ADMIN — PROPOFOL 100 MG: 10 INJECTION, EMULSION INTRAVENOUS at 08:59

## 2025-02-03 RX ADMIN — SODIUM CHLORIDE, SODIUM LACTATE, POTASSIUM CHLORIDE, AND CALCIUM CHLORIDE 125 ML/HR: .6; .31; .03; .02 INJECTION, SOLUTION INTRAVENOUS at 08:32

## 2025-02-03 NOTE — ANESTHESIA POSTPROCEDURE EVALUATION
Post-Op Assessment Note    Last Filed PACU Vitals:  Vitals Value Taken Time   Temp     Pulse 74    /85    Resp 12    SpO2 96

## 2025-02-03 NOTE — ANESTHESIA POSTPROCEDURE EVALUATION
Post-Op Assessment Note    CV Status:  Stable  Pain Score: 0    Pain management: adequate       Mental Status:  Sleepy   Hydration Status:  Euvolemic   PONV Controlled:  Controlled   Airway Patency:  Patent     Post Op Vitals Reviewed: Yes      Staff: CRNA       Last Filed PACU Vitals:  Vitals Value Taken Time   Temp     Pulse     BP     Resp     SpO2

## 2025-02-03 NOTE — H&P
[facility]  History & Physical     PATIENT INFO     Name: Kevin Vivar  YOB: 1950   Age: 74 y.o.   Sex: female   MRN: [id number]     HISTORY OF PRESENT ILLNESS     Kevin Vivar is a 74 y.o. year old female who presents for EGD for GERD.  No antithrombotics or anticoagulants.     REVIEW OF SYSTEMS     Per the HPI, and otherwise unremarkable.    Historical Information   Past Medical History:   Diagnosis Date    Asthma     Coronary artery disease     Degenerative disc disease, lumbar     Diabetes mellitus (HCC)     prediabetic    DVT (deep venous thrombosis) (HCC)     GERD (gastroesophageal reflux disease)     Heart disease     Hyperlipidemia     Hypertension     Hypothyroid     Osteoarthritis     PONV (postoperative nausea and vomiting)     Psoriasis     SOB (shortness of breath)      Past Surgical History:   Procedure Laterality Date    BREAST BIOPSY  ? many years ago    COLONOSCOPY  07/10/2020    [facility] - no polyps, only diverticulosis; Dr Kerwin Aj    CORONARY ANGIOPLASTY WITH STENT PLACEMENT  05/11/2016    Normal LM, normal LAD, normal LCx, 95% mid RCA with delayed antegrade flow; s/p ad hoc PC and KLAUDIA to RCA via right radial artery. 0% post stent residual, PORFIRIO 3 flow.    FOOT SURGERY      foot repair    JOINT REPLACEMENT Right 2006    TKA    JOINT REPLACEMENT Left 2012    TKA    KNEE SURGERY      LUMBAR FUSION      REPLACEMENT TOTAL KNEE Bilateral     SHOULDER SURGERY      TOTAL ABDOMINAL HYSTERECTOMY W/ BILATERAL SALPINGOOPHORECTOMY      WEILBY THUMB ARTHROPLASTY       Social History   Social History     Substance and Sexual Activity   Alcohol Use Yes    Alcohol/week: 4.0 - 7.0 standard drinks of alcohol    Types: 4 - 7 Glasses of wine per week    Comment: daily     Social History     Substance and Sexual Activity   Drug Use No     Social History     Tobacco Use   Smoking Status Former    Current packs/day: 0.00    Average packs/day: 0.5 packs/day for 8.0 years  (4.0 ttl pk-yrs)    Types: Cigarettes    Quit date:     Years since quittin.1    Passive exposure: Never   Smokeless Tobacco Never     Family History   Problem Relation Age of Onset    Heart failure Mother     Coronary artery disease Mother     Diabetes Mother     Hypertension Mother     Heart disease Mother     Hyperlipidemia Mother     Osteoarthritis Mother     Rashes / Skin problems Mother     Thyroid disease Mother     Stroke Father     Hypertension Father     Osteoarthritis Father     Transient ischemic attack Father     Heart failure Sister     Coronary artery disease Sister     Diabetes Sister     Hypertension Sister     Heart disease Sister     Rashes / Skin problems Sister     Coronary artery disease Maternal Grandmother     Hypertension Maternal Grandmother     Heart disease Maternal Grandmother     Heart failure Maternal Grandmother     Coronary artery disease Maternal Grandfather     Heart disease Maternal Grandfather     Stomach cancer Paternal Grandfather     Breast cancer Neg Hx     Colon cancer Neg Hx     Ovarian cancer Neg Hx     Uterine cancer Neg Hx         MEDICATIONS & ALLERGIES     Current Outpatient Medications   Medication Instructions    acetaminophen (TYLENOL) 650 mg    albuterol (PROVENTIL HFA,VENTOLIN HFA) 90 mcg/act inhaler 2 puffs, Every 6 hours PRN    aspirin 81 mg, Daily    atorvastatin (LIPITOR) 80 mg, Daily    betamethasone, augmented, (DIPROLENE) 0.05 % lotion As needed    Biotin 2,500 mcg, Daily    budesonide-formoterol (Symbicort) 160-4.5 mcg/act inhaler 2 puffs, 2 times daily    cholecalciferol (VITAMIN D) 400 units/1 mL No dose, route, or frequency recorded.    gabapentin (NEURONTIN) 100 mg, 3 times daily    levothyroxine 150 mcg, Daily    loratadine (CLARITIN REDITABS) 10 mg, Daily    losartan (COZAAR) 100 mg, Daily    metoprolol succinate (TOPROL-XL) 25 mg, Daily    montelukast (SINGULAIR) 10 mg, Daily at bedtime    Multiple Vitamin (MULTI-VITAMIN DAILY PO) Take by  "mouth    omeprazole (PRILOSEC) 20 mg, 2 times daily    spironolactone (ALDACTONE) 25 mg, Daily     Allergies   Allergen Reactions    Banana - Food Allergy Other (See Comments)     stomach pain    Molds & Smuts Other (See Comments)    Naproxen      Other reaction(s): Swelling    Other Other (See Comments)    Penicillins Other (See Comments)     ANCEF TOLERATED-unknown as per mother    Tetracycline Other (See Comments)     hives swollen painful joints        PHYSICAL EXAM      Objective   Blood pressure 151/72, pulse 78, temperature (!) 96.9 °F (36.1 °C), temperature source Temporal, resp. rate 16, height 5' 7\" (1.702 m), weight 100 kg (221 lb), SpO2 97%. Body mass index is 34.61 kg/m².    General Appearance:   Alert, cooperative, no distress   Lungs:   Equal chest rise, respirations unlabored    Heart:   Regular rate and rhythm   Abdomen:   Soft, non-tender, non-distended; normal bowel sounds; no masses, no organomegaly    Extremities:   No edema       ASSESSMENT & PLAN     This is a 74 y.o. year old female here for EGD, and she is stable and optimized for her procedure.      Pankaj Arteaga D.O.  [facility]  Division of Gastroenterology & Hepatology  Available on Tiger[employer]  [email or web address]    ** Please Note: This note is constructed using a voice recognition dictation system. **  "

## 2025-02-03 NOTE — ANESTHESIA PREPROCEDURE EVALUATION
Procedure:  EGD    Relevant Problems   CARDIO   (+) Anginal syndrome (HCC)   (+) Coronary disease   (+) Essential hypertension   (+) Hyperlipidemia      GI/HEPATIC   (+) Gastroesophageal reflux disease      MUSCULOSKELETAL   (+) Arthritis   (+) Osteoarthritis, multiple sites      PULMONARY   (+) Asthma        Physical Exam    Airway    Mallampati score: II         Dental       Cardiovascular      Pulmonary      Other Findings  post-pubertal.      Anesthesia Plan  ASA Score- 2     Anesthesia Type- IV sedation with anesthesia with ASA Monitors.         Additional Monitors:     Airway Plan:            Plan Factors-    Chart reviewed.                      Induction- intravenous.    Postoperative Plan-         Informed Consent- Anesthetic plan and risks discussed with patient.  I personally reviewed this patient with the CRNA. Discussed and agreed on the Anesthesia Plan with the CRNA..      NPO Status:  Vitals Value Taken Time   Date of last liquid 02/02/25 02/03/25 0821   Time of last liquid 1000 02/03/25 0821   Date of last solid 02/02/25 02/03/25 0821   Time of last solid 1900 02/03/25 0821

## 2025-02-03 NOTE — ANESTHESIA POSTPROCEDURE EVALUATION
Post-Op Assessment Note    CV Status:  Stable    Pain management: adequate       Mental Status:  Alert and awake   Hydration Status:  Euvolemic   PONV Controlled:  Controlled   Airway Patency:  Patent     Post Op Vitals Reviewed: Yes    No anethesia notable event occurred.    Staff: Anesthesiologist           Last Filed PACU Vitals:  Vitals Value Taken Time   Temp 97.8 °F (36.6 °C) 02/03/25 0912   Pulse 72 02/03/25 0950   /68 02/03/25 0950   Resp 18 02/03/25 0950   SpO2 98 % 02/03/25 0950       Modified Nguyen:     Vitals Value Taken Time   Activity 2 02/03/25 0950   Respiration 2 02/03/25 0950   Circulation 2 02/03/25 0950   Consciousness 2 02/03/25 0950   Oxygen Saturation 2 02/03/25 0950     Modified Nguyen Score: 10

## 2025-02-04 PROCEDURE — 88305 TISSUE EXAM BY PATHOLOGIST: CPT | Performed by: SPECIALIST

## 2025-02-05 ENCOUNTER — RESULTS FOLLOW-UP (OUTPATIENT)
Dept: GASTROENTEROLOGY | Facility: CLINIC | Age: 75
End: 2025-02-05

## 2025-03-11 ENCOUNTER — OFFICE VISIT (OUTPATIENT)
Dept: CARDIOLOGY CLINIC | Facility: CLINIC | Age: 75
End: 2025-03-11
Payer: MEDICARE

## 2025-03-11 VITALS
WEIGHT: 228 LBS | SYSTOLIC BLOOD PRESSURE: 128 MMHG | DIASTOLIC BLOOD PRESSURE: 80 MMHG | HEART RATE: 65 BPM | HEIGHT: 67 IN | BODY MASS INDEX: 35.79 KG/M2

## 2025-03-11 DIAGNOSIS — R60.0 BILATERAL LEG EDEMA: ICD-10-CM

## 2025-03-11 DIAGNOSIS — I25.10 CORONARY ARTERY DISEASE INVOLVING NATIVE CORONARY ARTERY OF NATIVE HEART WITHOUT ANGINA PECTORIS: Primary | ICD-10-CM

## 2025-03-11 DIAGNOSIS — E78.2 MIXED HYPERLIPIDEMIA: ICD-10-CM

## 2025-03-11 PROCEDURE — 99214 OFFICE O/P EST MOD 30 MIN: CPT | Performed by: INTERNAL MEDICINE

## 2025-03-11 PROCEDURE — 93000 ELECTROCARDIOGRAM COMPLETE: CPT | Performed by: INTERNAL MEDICINE

## 2025-03-11 RX ORDER — LEVOTHYROXINE SODIUM 150 UG/1
150 TABLET ORAL
COMMUNITY

## 2025-03-11 NOTE — PROGRESS NOTES
Patient ID: Kevin Vivar is a 74 y.o. female.        Plan:      Assessment & Plan  Coronary artery disease involving native coronary artery of native heart without angina pectoris    Mixed hyperlipidemia  Tolerating high intensity statin therapy.  Bilateral leg edema  Minimal and chronic.      Follow up Plan/Other summary comments:  We talked about weight and fitness concentrating on fitness.  She would like to minimize visits so we settled on a 2-year follow-up visit unless there are symptoms.    HPI: Patient is here for follow-up regarding the above.  No chest pain or chest pressure since her last visit.  She remains active although sciatica prevents as much fitness as she would like.    On 05/11/2016, because of exertional chest pressure, she underwent coronary angiography.  There was tight stenosis of the mid right coronary artery which was stented.  Other vessels looked okay.  She has not had similar symptoms since.  Results for orders placed or performed in visit on 03/11/25   POCT ECG    Impression    NSR. RBBB. NSSTs. No change from prior.         Most recent or relevant cardiac/vascular testing:     Stress-echo 2/7/2023: WNL.      Past Surgical History:   Procedure Laterality Date    BREAST BIOPSY  ? many years ago    COLONOSCOPY  07/10/2020    [facility] - no polyps, only diverticulosis; Dr Kerwin Aj    CORONARY ANGIOPLASTY WITH STENT PLACEMENT  05/11/2016    Normal LM, normal LAD, normal LCx, 95% mid RCA with delayed antegrade flow; s/p ad hoc PC and KLAUDIA to RCA via right radial artery. 0% post stent residual, PORFIRIO 3 flow.    FOOT SURGERY      foot repair    JOINT REPLACEMENT Right 2006    TKA    JOINT REPLACEMENT Left 2012    TKA    KNEE SURGERY      LUMBAR FUSION      REPLACEMENT TOTAL KNEE Bilateral     SHOULDER SURGERY      TOTAL ABDOMINAL HYSTERECTOMY W/ BILATERAL SALPINGOOPHORECTOMY      WEILBY THUMB ARTHROPLASTY         Lipid Profile: Reviewed      Review of Systems   10  point ROS  was otherwise  "non pertinent or negative except as per HPI or as below.   Gait:  Normal.        Objective:     /80   Pulse 65   Ht 5' 7\" (1.702 m)   Wt 103 kg (228 lb)   BMI 35.71 kg/m²     PHYSICAL EXAM:    General:  Normal appearance in no distress.  Eyes:  Anicteric.  Oral mucosa:  Moist.  Neck:  No JVD. Carotid upstrokes are brisk without bruits.  No masses.  Chest:  Clear to auscultation.  Cardiac:  No palpable PMI.  Normal S1 and S2.  No murmur gallop or rub.  Abdomen:  Soft and nontender. No palpable organomegaly or aortic enlargement.  Extremities: Trace  peripheral edema.  Musculoskeletal:  Symmetric.   Vascular:  Femoral pulses are brisk without bruits.  Popliteal pulses are intact bilaterally.   Pedal pulses are intact.  Neuro:  Grossly symmetric.  Psych:  Alert and oriented x3.      Meds reviewed.    Past Medical History:   Diagnosis Date    Asthma     Coronary artery disease     Degenerative disc disease, lumbar     Diabetes mellitus (HCC)     prediabetic    DVT (deep venous thrombosis) (HCC)     GERD (gastroesophageal reflux disease)     Heart disease     Hyperlipidemia     Hypertension     Hypothyroid     Osteoarthritis     PONV (postoperative nausea and vomiting)     Psoriasis     SOB (shortness of breath)            Social History     Tobacco Use   Smoking Status Former    Current packs/day: 0.00    Average packs/day: 0.5 packs/day for 8.0 years (4.0 ttl pk-yrs)    Types: Cigarettes    Quit date:     Years since quittin.2    Passive exposure: Never   Smokeless Tobacco Never             "

## 2025-03-19 ENCOUNTER — APPOINTMENT (OUTPATIENT)
Dept: LAB | Facility: HOSPITAL | Age: 75
End: 2025-03-19
Payer: MEDICARE

## 2025-03-19 DIAGNOSIS — E78.5 HYPERLIPIDEMIA, UNSPECIFIED HYPERLIPIDEMIA TYPE: ICD-10-CM

## 2025-03-19 DIAGNOSIS — Z79.899 ENCOUNTER FOR LONG-TERM (CURRENT) USE OF MEDICATIONS: ICD-10-CM

## 2025-03-19 DIAGNOSIS — Z01.84 IMMUNITY STATUS TESTING: Primary | ICD-10-CM

## 2025-03-19 DIAGNOSIS — R73.9 HYPERGLYCEMIA: ICD-10-CM

## 2025-03-19 LAB
25(OH)D3 SERPL-MCNC: 44.4 NG/ML (ref 30–100)
ALBUMIN SERPL BCG-MCNC: 4.3 G/DL (ref 3.5–5)
ALP SERPL-CCNC: 59 U/L (ref 34–104)
ALT SERPL W P-5'-P-CCNC: 17 U/L (ref 7–52)
ANION GAP SERPL CALCULATED.3IONS-SCNC: 10 MMOL/L (ref 4–13)
AST SERPL W P-5'-P-CCNC: 19 U/L (ref 13–39)
BILIRUB SERPL-MCNC: 0.51 MG/DL (ref 0.2–1)
BUN SERPL-MCNC: 23 MG/DL (ref 5–25)
CALCIUM SERPL-MCNC: 9.2 MG/DL (ref 8.4–10.2)
CHLORIDE SERPL-SCNC: 103 MMOL/L (ref 96–108)
CHOLEST SERPL-MCNC: 151 MG/DL (ref ?–200)
CO2 SERPL-SCNC: 28 MMOL/L (ref 21–32)
CREAT SERPL-MCNC: 0.81 MG/DL (ref 0.6–1.3)
ERYTHROCYTE [DISTWIDTH] IN BLOOD BY AUTOMATED COUNT: 12.5 % (ref 11.6–15.1)
EST. AVERAGE GLUCOSE BLD GHB EST-MCNC: 123 MG/DL
GFR SERPL CREATININE-BSD FRML MDRD: 71 ML/MIN/1.73SQ M
GLUCOSE P FAST SERPL-MCNC: 108 MG/DL (ref 65–99)
HBA1C MFR BLD: 5.9 %
HCT VFR BLD AUTO: 40.3 % (ref 34.8–46.1)
HDLC SERPL-MCNC: 75 MG/DL
HGB BLD-MCNC: 13 G/DL (ref 11.5–15.4)
LDLC SERPL CALC-MCNC: 58 MG/DL (ref 0–100)
MCH RBC QN AUTO: 30.4 PG (ref 26.8–34.3)
MCHC RBC AUTO-ENTMCNC: 32.3 G/DL (ref 31.4–37.4)
MCV RBC AUTO: 94 FL (ref 82–98)
NONHDLC SERPL-MCNC: 76 MG/DL
PLATELET # BLD AUTO: 194 THOUSANDS/UL (ref 149–390)
PMV BLD AUTO: 11.1 FL (ref 8.9–12.7)
POTASSIUM SERPL-SCNC: 3.9 MMOL/L (ref 3.5–5.3)
PROT SERPL-MCNC: 7.2 G/DL (ref 6.4–8.4)
RBC # BLD AUTO: 4.28 MILLION/UL (ref 3.81–5.12)
SODIUM SERPL-SCNC: 141 MMOL/L (ref 135–147)
T4 FREE SERPL-MCNC: 1.29 NG/DL (ref 0.61–1.12)
TRIGL SERPL-MCNC: 89 MG/DL (ref ?–150)
TSH SERPL DL<=0.05 MIU/L-ACNC: 1.29 UIU/ML (ref 0.45–4.5)
WBC # BLD AUTO: 4.39 THOUSAND/UL (ref 4.31–10.16)

## 2025-03-19 PROCEDURE — 85027 COMPLETE CBC AUTOMATED: CPT

## 2025-03-19 PROCEDURE — 86765 RUBEOLA ANTIBODY: CPT

## 2025-03-19 PROCEDURE — 36415 COLL VENOUS BLD VENIPUNCTURE: CPT

## 2025-03-19 PROCEDURE — 86735 MUMPS ANTIBODY: CPT

## 2025-03-19 PROCEDURE — 82306 VITAMIN D 25 HYDROXY: CPT

## 2025-03-19 PROCEDURE — 80061 LIPID PANEL: CPT

## 2025-03-19 PROCEDURE — 84443 ASSAY THYROID STIM HORMONE: CPT

## 2025-03-19 PROCEDURE — 80053 COMPREHEN METABOLIC PANEL: CPT

## 2025-03-19 PROCEDURE — 84439 ASSAY OF FREE THYROXINE: CPT

## 2025-03-19 PROCEDURE — 86762 RUBELLA ANTIBODY: CPT

## 2025-03-19 PROCEDURE — 83036 HEMOGLOBIN GLYCOSYLATED A1C: CPT

## 2025-03-20 LAB
MEV IGG SER IA-ACNC: >300 AU/ML
MUV IGG SER IA-ACNC: 62.2 AU/ML
RUBV IGG SERPL IA-ACNC: 3.79 INDEX

## 2025-08-05 ENCOUNTER — HOSPITAL ENCOUNTER (OUTPATIENT)
Dept: BONE DENSITY | Facility: HOSPITAL | Age: 75
Discharge: HOME | End: 2025-08-05
Payer: MEDICARE

## 2025-08-05 VITALS — BODY MASS INDEX: 35.63 KG/M2 | WEIGHT: 227 LBS | HEIGHT: 67 IN

## 2025-08-05 DIAGNOSIS — M81.0 OSTEOPOROSIS, UNSPECIFIED OSTEOPOROSIS TYPE, UNSPECIFIED PATHOLOGICAL FRACTURE PRESENCE: ICD-10-CM

## 2025-08-05 PROCEDURE — 77080 DXA BONE DENSITY AXIAL: CPT
